# Patient Record
Sex: MALE | Race: BLACK OR AFRICAN AMERICAN | NOT HISPANIC OR LATINO | Employment: UNEMPLOYED | URBAN - METROPOLITAN AREA
[De-identification: names, ages, dates, MRNs, and addresses within clinical notes are randomized per-mention and may not be internally consistent; named-entity substitution may affect disease eponyms.]

---

## 2023-09-29 ENCOUNTER — HOSPITAL ENCOUNTER (EMERGENCY)
Facility: HOSPITAL | Age: 43
Discharge: HOME | End: 2023-09-29
Attending: STUDENT IN AN ORGANIZED HEALTH CARE EDUCATION/TRAINING PROGRAM

## 2023-09-29 VITALS
DIASTOLIC BLOOD PRESSURE: 87 MMHG | WEIGHT: 188.69 LBS | HEART RATE: 74 BPM | TEMPERATURE: 99 F | BODY MASS INDEX: 27.01 KG/M2 | OXYGEN SATURATION: 100 % | HEIGHT: 70 IN | SYSTOLIC BLOOD PRESSURE: 160 MMHG | RESPIRATION RATE: 16 BRPM

## 2023-09-29 DIAGNOSIS — J06.9 VIRAL URI WITH COUGH: Primary | ICD-10-CM

## 2023-09-29 LAB
FLUAV AG UPPER RESP QL IA.RAPID: NOT DETECTED
FLUBV AG UPPER RESP QL IA.RAPID: NOT DETECTED
RSV A 5' UTR RNA NPH QL NAA+PROBE: NOT DETECTED
SARS-COV-2 RNA RESP QL NAA+PROBE: NOT DETECTED
STREP A PCR (OHS): NOT DETECTED

## 2023-09-29 PROCEDURE — 87651 STREP A DNA AMP PROBE: CPT | Performed by: STUDENT IN AN ORGANIZED HEALTH CARE EDUCATION/TRAINING PROGRAM

## 2023-09-29 PROCEDURE — 25000003 PHARM REV CODE 250: Performed by: STUDENT IN AN ORGANIZED HEALTH CARE EDUCATION/TRAINING PROGRAM

## 2023-09-29 PROCEDURE — 0241U COVID/RSV/FLU A&B PCR: CPT | Performed by: STUDENT IN AN ORGANIZED HEALTH CARE EDUCATION/TRAINING PROGRAM

## 2023-09-29 PROCEDURE — 99284 EMERGENCY DEPT VISIT MOD MDM: CPT | Mod: 25

## 2023-09-29 RX ORDER — PROMETHAZINE HYDROCHLORIDE AND DEXTROMETHORPHAN HYDROBROMIDE 6.25; 15 MG/5ML; MG/5ML
5 SYRUP ORAL EVERY 6 HOURS PRN
Qty: 100 ML | Refills: 0 | Status: SHIPPED | OUTPATIENT
Start: 2023-09-29 | End: 2023-10-10 | Stop reason: ALTCHOICE

## 2023-09-29 RX ORDER — ACETAMINOPHEN 500 MG
1000 TABLET ORAL
Status: COMPLETED | OUTPATIENT
Start: 2023-09-29 | End: 2023-09-29

## 2023-09-29 RX ORDER — FLUTICASONE PROPIONATE 50 MCG
1 SPRAY, SUSPENSION (ML) NASAL 2 TIMES DAILY PRN
Qty: 15 G | Refills: 0 | Status: SHIPPED | OUTPATIENT
Start: 2023-09-29 | End: 2023-10-10 | Stop reason: SDUPTHER

## 2023-09-29 RX ORDER — IBUPROFEN 800 MG/1
800 TABLET ORAL EVERY 6 HOURS PRN
Qty: 20 TABLET | Refills: 0 | OUTPATIENT
Start: 2023-09-29 | End: 2024-01-03

## 2023-09-29 RX ADMIN — ACETAMINOPHEN 1000 MG: 500 TABLET, FILM COATED ORAL at 09:09

## 2023-09-30 NOTE — ED PROVIDER NOTES
Encounter Date: 9/29/2023       History     Chief Complaint   Patient presents with    Cough    Sore Throat    Fever     Cough, Congestion, sore throat x 2 days. Temp 101.1 in triage no meds pta.      43-year-old male with past medical history significant for hypertension, smoking and polysubstance abuse presents to ED complaining of 2 day history of URI symptoms marked by sore throat, congestion and productive cough.  Patient is febrile on arrival in ED with temperature of 101.1°, reports he was unaware of fever prior to this.  Allergy medicine at home without significant relief of symptoms.  Reports he was recently exposed to a sister who had similar symptoms, but was not tested.  Denies chest pain, shortness of breath, hemoptysis, wheezing, headache, dizziness, neck stiffness, dysphagia, stridor, drooling, nausea/vomiting/diarrhea.  Aside from elevated temperature vitals otherwise stable, patient in no acute distress.    Review of patient's allergies indicates:  No Known Allergies  History reviewed. No pertinent past medical history.  History reviewed. No pertinent surgical history.  History reviewed. No pertinent family history.  Social History     Tobacco Use    Smoking status: Every Day     Average packs/day: 0.9 packs/day for 10.7 years (10.0 ttl pk-yrs)     Types: Cigarettes     Start date: 2013    Smokeless tobacco: Never   Substance Use Topics    Drug use: Yes     Types: Marijuana     Review of Systems   All other systems reviewed and are negative.      Physical Exam     Initial Vitals [09/29/23 2110]   BP Pulse Resp Temp SpO2   (!) 160/87 89 16 (!) 101.1 °F (38.4 °C) 98 %      MAP       --         Physical Exam    Nursing note and vitals reviewed.  Constitutional: He appears well-developed and well-nourished. He is not diaphoretic. No distress.   HENT:   Head: Normocephalic and atraumatic.   Right Ear: External ear normal.   Left Ear: External ear normal.   Nose: Mucosal edema and rhinorrhea  present. Right sinus exhibits no maxillary sinus tenderness and no frontal sinus tenderness. Left sinus exhibits no maxillary sinus tenderness and no frontal sinus tenderness.   Mouth/Throat: Uvula is midline and mucous membranes are normal. No trismus in the jaw. No uvula swelling. Posterior oropharyngeal erythema present. No oropharyngeal exudate or posterior oropharyngeal edema.   Eyes: Conjunctivae are normal. Pupils are equal, round, and reactive to light.   Neck: Neck supple. No JVD present.   Normal range of motion.  Cardiovascular:  Normal rate, regular rhythm, normal heart sounds and intact distal pulses.     Exam reveals no gallop and no friction rub.       No murmur heard.  Pulmonary/Chest: Breath sounds normal. No stridor. No respiratory distress. He has no wheezes. He has no rhonchi. He has no rales. He exhibits no tenderness.   Abdominal: Abdomen is soft. Bowel sounds are normal. He exhibits no distension. There is no abdominal tenderness. There is no rebound and no guarding.   Musculoskeletal:         General: No tenderness or edema. Normal range of motion.      Cervical back: Normal range of motion and neck supple.     Lymphadenopathy:     He has no cervical adenopathy.   Neurological: He is alert and oriented to person, place, and time. No cranial nerve deficit or sensory deficit. GCS score is 15. GCS eye subscore is 4. GCS verbal subscore is 5. GCS motor subscore is 6.   Skin: Skin is warm and dry. Capillary refill takes less than 2 seconds. No rash noted. No pallor.   Psychiatric: He has a normal mood and affect.       ED Course   Procedures  Labs Reviewed   COVID/RSV/FLU A&B PCR - Normal    Narrative:     The Xpert Xpress SARS-CoV-2/FLU/RSV plus is a rapid, multiplexed real-time PCR test intended for the simultaneous qualitative detection and differentiation of SARS-CoV-2, Influenza A, Influenza B, and respiratory syncytial virus (RSV) viral RNA in either nasopharyngeal swab or nasal swab  specimens.         STREP GROUP A BY PCR - Normal    Narrative:     The Xpert Xpress Strep A test is a rapid, qualitative in vitro diagnostic test for the detection of Streptococcus pyogenes (Group A ß-hemolytic Streptococcus, Strep A) in throat swab specimens from patients with signs and symptoms of pharyngitis.            Imaging Results              X-Ray Chest PA And Lateral (Final result)  Result time 09/30/23 08:45:01      Final result by Rashid Espitia MD (09/30/23 08:45:01)                   Impression:      Findings concerning for left lower lobe pneumonia.      Electronically signed by: Rashid Espitia  Date:    09/30/2023  Time:    08:45               Narrative:    EXAMINATION:  XR CHEST PA AND LATERAL    CLINICAL HISTORY:  cough, fever;    TECHNIQUE:  Two views of the chest    COMPARISON:  No prior imaging available for comparison.    FINDINGS:  Left retrocardiac opacification.    The cardiomediastinal silhouette is within normal limits.    No acute osseous abnormality.                        Wet Read by Saman Tolentino PA (09/29/23 22:49:03, [facility], Emergency Medicine)    No acute cardiopulmonary abnormality.                                     Medications   acetaminophen tablet 1,000 mg (1,000 mg Oral Given 9/29/23 2134)     Medical Decision Making  DDx:  COVID, flu, other viral illness, strep throat, pneumonia, allergic rhinitis among other possibilities    ED management:  Given p.o. Tylenol    ED course:  COVID flu and strep swabs all negative.  No evidence of pneumonia or other acute cardiopulmonary abnormality on chest x-ray.  Patient is nontoxic appearing with unremarkable vitals and no signs of respiratory or other distress.  Stable for discharge.  I will send home with meds for symptom relief.  Referral placed to IM clinic to establish PCP for follow-up.  ED precautions given for new or worsening symptoms and patient verbalized understanding.    Amount and/or  Complexity of Data Reviewed  Labs: ordered. Decision-making details documented in ED Course.  Radiology: ordered and independent interpretation performed. Decision-making details documented in ED Course.    Risk  OTC drugs.  Prescription drug management.         APC / Resident Notes:   I was not physically present during the history or exam of this patient. I was available at all times for consultation.    The radiologist the following day formally read the chest x-ray as a left lower lobe pneumonia. Martha charge nurse will notify the patient and call in doxycycline antibiotic to their pharmacy of choice. ([facility])                        Clinical Impression:   Final diagnoses:  [J06.9] Viral URI with cough (Primary)        ED Disposition Condition    Discharge Good          ED Prescriptions       Medication Sig Dispense Start Date End Date Auth. Provider    promethazine-dextromethorphan (PROMETHAZINE-DM) 6.25-15 mg/5 mL Syrp Take 5 mLs by mouth every 6 (six) hours as needed (cough). 100 mL 9/29/2023 10/4/2023 Saman Tolentino PA    ibuprofen (ADVIL,MOTRIN) 800 MG tablet Take 1 tablet (800 mg total) by mouth every 6 (six) hours as needed for Pain. 20 tablet 9/29/2023 -- Saman Tolentino PA    fluticasone propionate (FLONASE) 50 mcg/actuation nasal spray 1 spray (50 mcg total) by Each Nostril route 2 (two) times daily as needed for Rhinitis. 15 g 9/29/2023 -- Saman Tolentino PA          Follow-up Information       Follow up With Specialties Details Why Contact Info Additional Information    [facility] Internal Medicine In 2 weeks  [address] [facility] [address] [phone] Internal Medicine Clinic Entrance #1    [facility] Emergency Medicine  As needed, If symptoms worsen [address] [address] [phone]              Saman Tolentino PA  09/29/23 [id number]       Abram Hansen MD  09/30/23 [id number]

## 2023-09-30 NOTE — ED NOTES
Received a verbal order from PABLO Acosta to call in an antibiotic for the patient due to a re-read of chest x ray from the radiologist indicating pneumonia.

## 2023-10-10 ENCOUNTER — OFFICE VISIT (OUTPATIENT)
Dept: INTERNAL MEDICINE | Facility: CLINIC | Age: 43
End: 2023-10-10

## 2023-10-10 ENCOUNTER — HOSPITAL ENCOUNTER (OUTPATIENT)
Dept: RADIOLOGY | Facility: HOSPITAL | Age: 43
Discharge: HOME | End: 2023-10-10
Attending: NURSE PRACTITIONER

## 2023-10-10 VITALS
SYSTOLIC BLOOD PRESSURE: 130 MMHG | TEMPERATURE: 98 F | BODY MASS INDEX: 27.17 KG/M2 | HEIGHT: 70 IN | WEIGHT: 189.81 LBS | HEART RATE: 76 BPM | RESPIRATION RATE: 18 BRPM | DIASTOLIC BLOOD PRESSURE: 82 MMHG

## 2023-10-10 DIAGNOSIS — Z13.1 SCREENING FOR DIABETES MELLITUS: ICD-10-CM

## 2023-10-10 DIAGNOSIS — Z12.5 SCREENING FOR MALIGNANT NEOPLASM OF PROSTATE: ICD-10-CM

## 2023-10-10 DIAGNOSIS — F41.9 ANXIETY AND DEPRESSION: Chronic | ICD-10-CM

## 2023-10-10 DIAGNOSIS — F43.10 PTSD (POST-TRAUMATIC STRESS DISORDER): Chronic | ICD-10-CM

## 2023-10-10 DIAGNOSIS — F31.9 BIPOLAR 1 DISORDER: Chronic | ICD-10-CM

## 2023-10-10 DIAGNOSIS — F32.A ANXIETY AND DEPRESSION: Chronic | ICD-10-CM

## 2023-10-10 DIAGNOSIS — J06.9 VIRAL URI WITH COUGH: ICD-10-CM

## 2023-10-10 DIAGNOSIS — Z76.89 ENCOUNTER TO ESTABLISH CARE: ICD-10-CM

## 2023-10-10 DIAGNOSIS — Z11.3 ROUTINE SCREENING FOR STI (SEXUALLY TRANSMITTED INFECTION): ICD-10-CM

## 2023-10-10 DIAGNOSIS — I10 PRIMARY HYPERTENSION: Chronic | ICD-10-CM

## 2023-10-10 DIAGNOSIS — J18.9 PNEUMONIA OF LEFT LOWER LOBE DUE TO INFECTIOUS ORGANISM: ICD-10-CM

## 2023-10-10 DIAGNOSIS — Z72.0 TOBACCO ABUSE: Primary | ICD-10-CM

## 2023-10-10 PROCEDURE — 71046 X-RAY EXAM CHEST 2 VIEWS: CPT | Mod: TC

## 2023-10-10 PROCEDURE — 99204 OFFICE O/P NEW MOD 45 MIN: CPT | Mod: 25,S$PBB,, | Performed by: NURSE PRACTITIONER

## 2023-10-10 PROCEDURE — 99215 OFFICE O/P EST HI 40 MIN: CPT | Mod: PBBFAC | Performed by: NURSE PRACTITIONER

## 2023-10-10 PROCEDURE — 99204 PR OFFICE/OUTPT VISIT, NEW, LEVL IV, 45-59 MIN: ICD-10-PCS | Mod: 25,S$PBB,, | Performed by: NURSE PRACTITIONER

## 2023-10-10 PROCEDURE — 99406 PR TOBACCO USE CESSATION INTERMEDIATE 3-10 MINUTES: ICD-10-PCS | Mod: S$PBB,,, | Performed by: NURSE PRACTITIONER

## 2023-10-10 PROCEDURE — 99406 BEHAV CHNG SMOKING 3-10 MIN: CPT | Mod: S$PBB,,, | Performed by: NURSE PRACTITIONER

## 2023-10-10 RX ORDER — FLUTICASONE PROPIONATE 50 MCG
1 SPRAY, SUSPENSION (ML) NASAL DAILY
Qty: 16 G | Refills: 1 | Status: SHIPPED | OUTPATIENT
Start: 2023-10-10

## 2023-10-10 RX ORDER — FLUTICASONE PROPIONATE 50 MCG
1 SPRAY, SUSPENSION (ML) NASAL DAILY
Qty: 16 G | Refills: 1 | Status: SHIPPED | OUTPATIENT
Start: 2023-10-10 | End: 2023-10-10 | Stop reason: SDUPTHER

## 2023-10-10 RX ORDER — LORATADINE 10 MG/1
10 TABLET ORAL DAILY
Qty: 30 TABLET | Refills: 1 | Status: SHIPPED | OUTPATIENT
Start: 2023-10-10

## 2023-10-10 RX ORDER — METHYLPREDNISOLONE 4 MG/1
TABLET ORAL
Qty: 21 EACH | Refills: 0 | Status: SHIPPED | OUTPATIENT
Start: 2023-10-10 | End: 2023-10-31

## 2023-10-10 RX ORDER — BENZONATATE 100 MG/1
100 CAPSULE ORAL 3 TIMES DAILY PRN
Qty: 30 CAPSULE | Refills: 0 | Status: SHIPPED | OUTPATIENT
Start: 2023-10-10 | End: 2023-10-20

## 2023-10-10 NOTE — PROGRESS NOTES
Milana Shields, CAMELIA   [facility]  [address] [facility] [address]      PATIENT NAME: Flako Mccray  : 1980  DATE: 10/10/23  MRN: [id number]      Reason for Visit / Chief Complaint: Establish Care (Fasting, refused vaccines)       History of Present Illness / Problem Focused Workflow     Flako Mccray is a 43 y.o. Black or  male presents to the clinic to establish primary care. PMH HTN, bipolar disorder, anxiety/depression, PTSD and tobacco abuse (onset ).    Today, pt presents to establish primary care after reporting to ED on 23 with URI symptoms - neg covid/flu/rsv/strep and CXR unremarkable. Was dx'd with LLL pneumonia rx'd z pack, flonase, IBU and promethazine DM with some improvement. Continues to endorse productive cough with thick white sputum. Pt recently moved to [place] from [place] in Aug after being incarcerated x 10 yrs. States was taking meds in senior care but does not know the name. Does not follow low sodium diet. Pt reports does not feel he needs psychiatric meds at this time. Reports had a lot of thins that went on in senior care that caused a lot of stress which he does not currently have to deal with. Declines vaccines today. Smokes 1 ppd/cigs and marijuana daily. Is fasting today. Will [facility] to [facility] for financial assistance after visit. Denies chest pain, shortness of breath, fever, headache, dizziness, weakness, abdominal pain, nausea, vomiting, diarrhea, constipation, dysuria, depression, anxiety, SI/HI.    Review of Systems     Review of Systems     See HPI for details    Constitutional: Denies Change in appetite. Denies Chills. Denies Fever. Denies Night sweats.  Eye: Denies Blurred vision. Denies Discharge. Denies Eye pain.  ENT: Denies Decreased hearing. Denies Sore throat. Denies Swollen glands.  Respiratory: Admits Cough. Denies Shortness of breath. Denies Shortness of breath  with exertion. Denies Wheezing.  Cardiovascular: Denies Chest pain at rest. Denies Chest pain with exertion. Denies Irregular heartbeat. Denies Palpitations. Denies Edema.  Gastrointestinal: Denies Abdominal pain. Denies Diarrhea. Denies Nausea. Denies Vomiting. Denies Hematemesis or Hematochezia.  Genitourinary: Denies Dysuria. Denies Urinary frequency. Denies Urinary urgency. Denies Blood in urine.  Endocrine: Denies Cold intolerance. Denies Excessive thirst. Denies Heat intolerance. Denies Weight loss. Denies Weight gain.  Musculoskeletal: Denies Painful joints. Denies Weakness.  Integumentary: Denies Rash. Denies Itching. Denies Dry skin.  Neurologic: Denies Dizziness. Denies Fainting. Denies Headache.  Psychiatric: Denies Depression. Denies Anxiety. Denies Suicidal/Homicidal ideations.    All Other ROS: Negative except as stated in HPI.     Medical / Surgical / Social / Family History       No past medical history on file.     History reviewed. No pertinent surgical history.    Social History     Socioeconomic History    Marital status:    Tobacco Use    Smoking status: Every Day     Current packs/day: 1.00     Average packs/day: 1 pack/day for 10.8 years (10.8 ttl pk-yrs)     Types: Cigarettes     Start date: 2013    Smokeless tobacco: Never   Substance and Sexual Activity    Alcohol use: Yes     Comment: liquor 2 x a week    Drug use: Yes     Types: Marijuana     Comment: daily    Sexual activity: Yes     Partners: Female     Social Determinants of Health     Transportation Needs: No Transportation Needs (10/10/2023)    PRAPARE - Transportation     Lack of Transportation (Medical): No     Lack of Transportation (Non-Medical): No   Housing Stability: Low Risk  (10/10/2023)    Housing Stability Vital Sign     Unable to Pay for Housing in the Last Year: No     Number of Places Lived in the Last Year: 2     Unstable Housing in the Last Year: No        Family History   Problem Relation Age of Onset     "Diabetes Mother     Hypertension Father     Diabetes Father     Aneurysm Sister     Diabetes Maternal Uncle     Cancer Paternal Aunt     Cancer Paternal Uncle     Aneurysm Paternal Grandmother     Cancer Paternal Grandfather         prostate    Stroke Paternal Grandfather     Cancer Paternal Cousin         Medications and Allergies     Medications  Current Outpatient Medications   Medication Instructions    fluticasone propionate (FLONASE) 50 mcg, Each Nostril, Daily    ibuprofen (ADVIL,MOTRIN) 800 mg, Oral, Every 6 hours PRN    loratadine (CLARITIN) 10 mg, Oral, Daily    methylPREDNISolone (MEDROL DOSEPACK) 4 mg tablet use as directed         Allergies  Review of patient's allergies indicates:  No Known Allergies    Physical Examination     /82 (BP Location: Left arm, Patient Position: Sitting, BP Method: Medium (Manual))   Pulse 76   Temp 98.3 °F (36.8 °C) (Oral)   Resp 18   Ht 5' 10" (1.778 m)   Wt 86.1 kg (189 lb 12.8 oz)   BMI 27.23 kg/m²     Physical Exam  HENT:      Nose:      Right Turbinates: Enlarged and swollen.      Left Turbinates: Enlarged and swollen.      Comments: +PND  Pulmonary:      Breath sounds: Examination of the right-lower field reveals rhonchi. Examination of the left-lower field reveals rhonchi. Rhonchi present.         General: Alert and oriented, No acute distress.  Head: Normocephalic, Atraumatic.  Eye: Pupils are equal, round and reactive to light, Extraocular movements are intact, Sclera non-icteric.  Ears/Nose/Throat: Normal, Mucosa moist,Clear.  Neck/Thyroid: Supple, Non-tender, No carotid bruit, No lymphadenopathy, No JVD, Full range of motion.  Respiratory: Non-labored respirations, Symmetrical chest wall expansion.  Cardiovascular: Regular rate and rhythm, S1/S2 normal, No murmurs, rubs or gallops.  Gastrointestinal: Soft, Non-tender, Non-distended, Normal bowel sounds, No palpable organomegaly.  Musculoskeletal: Normal range of motion.  Integumentary: Warm, Dry, " "Intact, No suspicious lesions or rashes.  Extremities: No clubbing, cyanosis or edema  Neurologic: No focal deficits, Cranial Nerves II-XII are grossly intact, Motor strength normal upper and lower extremities, Sensory exam intact.  Psychiatric: Normal interaction, Coherent speech, Appropriate affect       Results     No results found for: "WBC", "HGB", "HCT", "PLT", "CHOL", "TRIG", "LDLDIRECT", "ALT", "AST", "NA", "K", "CL", "CREATININE", "BUN", "CO2", "TSH", "PSA", "INR", "GLUF", "HGBA1C", "MICROALBUR"      Assessment and Plan (including Health Maintenance)     Plan:     1. Encounter to establish care  Labs ordered; pt is fasting.  Will attempt to obtain medical records from intermediate facility.    2. Pneumonia of left lower lobe due to infectious organism  Repeat CXR ordered; will notify of abn results.  Rx medrol dose pack.  Rx flonase and claritin.  Rx tessalon perles prn.  Smoking cessation discussed.    - X-Ray Chest PA And Lateral; Future    3. Primary hypertension  At goal without meds today.  Will continue to monitor.  Follow a low sodium (less than 2 grams of sodium per day), DASH diet.   Continue medications as prescribed.  Monitor blood pressure and report any consistent values greater than 140/90 and keep a log.  Encouraged smoking cessation to aid in BP reduction and co-morbidities.   Maintain healthy weight with a BMI goal of <30.   Aerobic exercise for 150 minutes per week (or 5 days a week for 30 minutes each day).    - CBC Auto Differential; Future  - Comprehensive Metabolic Panel; Future  - Lipid Panel; Future  - Microalbumin/Creatinine Ratio, Urine; Future  - TSH; Future  - Urinalysis, Reflex to Urine Culture; Future    4. Tobacco abuse  Smoking cessation discussed; total time 3 mins.   Pt not ready to quit.  Declines referral to Smoking Cessation program.  Discussed benefits of quitting including improved health, decreased cardiac/vascular/pulmonary/stroke risks as well as saving money.      5. " Anxiety and depression  Feels mood is stable without meds.  Declines referral to  to eval/treat.  Denies SI/HI.  Read positive daily meditations, avoid negative media, set healthy boundaries.  Exercise daily, keep consistent sleep pattern, eat a healthy diet.  Establish good social support, make changes to reduce stress.  Do not drink alcohol or use illicit drugs.  Reports any symptoms of suicidal/homicidal ideations or self harm immediately, go to nearest emergency room.      6. PTSD (post-traumatic stress disorder)  Feels mood is stable without meds.  Declines referral to  to eval/treat.  Denies SI/HI.  Read positive daily meditations, avoid negative media, set healthy boundaries.  Exercise daily, keep consistent sleep pattern, eat a healthy diet.  Establish good social support, make changes to reduce stress.  Do not drink alcohol or use illicit drugs.  Reports any symptoms of suicidal/homicidal ideations or self harm immediately, go to nearest emergency room.    7. Bipolar 1 disorder  Feels mood is stable without meds.  Declines referral to  to eval/treat.  Denies SI/HI or A/V hallucinations.  Read positive daily meditations, avoid negative media, set healthy boundaries.  Exercise daily, keep consistent sleep pattern, eat a healthy diet.  Establish good social support, make changes to reduce stress.  Do not drink alcohol or use illicit drugs.  Reports any symptoms of suicidal/homicidal ideations or self harm immediately, go to nearest emergency room.        Problem List Items Addressed This Visit          Psychiatric    Anxiety and depression (Chronic)    PTSD (post-traumatic stress disorder) (Chronic)    Bipolar 1 disorder (Chronic)       Pulmonary    LLL pneumonia    Relevant Orders    X-Ray Chest PA And Lateral       Cardiac/Vascular    Primary hypertension (Chronic)    Relevant Orders    CBC Auto Differential    Comprehensive Metabolic Panel    Lipid Panel    Microalbumin/Creatinine Ratio, Urine    TSH     Urinalysis, Reflex to Urine Culture       Other    Tobacco abuse - Primary (Chronic)    Encounter to establish care     Other Visit Diagnoses       Viral URI with cough        Screening for diabetes mellitus        Relevant Orders    Hemoglobin A1C    Screening for malignant neoplasm of prostate        Relevant Orders    PSA, Screening    Routine screening for STI (sexually transmitted infection)        Relevant Orders    Chlamydia/GC, PCR    Hepatitis Panel, Acute    HIV 1/2 Ag/Ab (4th Gen)    SYPHILIS ANTIBODY (WITH REFLEX RPR)              Health Maintenance Due   Topic Date Due    Hepatitis C Screening  Never done    Lipid Panel  Never done    COVID-19 Vaccine (1) Never done    Pneumococcal Vaccines (Age 0-64) (1 - PCV) Never done    HIV Screening  Never done    TETANUS VACCINE  Never done    Hemoglobin A1c (Diabetic Prevention Screening)  Never done       Follow up in about 3 weeks (around 10/31/2023) for Follow up, Lab Results, HTN.        Signature:  CAMELIA Schroeder  [facility]  [address] [facility] [address]

## 2023-10-12 ENCOUNTER — TELEPHONE (OUTPATIENT)
Dept: INTERNAL MEDICINE | Facility: CLINIC | Age: 43
End: 2023-10-12

## 2023-10-12 DIAGNOSIS — J18.9 PNEUMONIA OF LEFT LOWER LOBE DUE TO INFECTIOUS ORGANISM: Primary | ICD-10-CM

## 2023-10-12 NOTE — TELEPHONE ENCOUNTER
----- Message from CAMELIA Cuadra sent at 10/12/2023  7:06 AM CDT -----  Inform the pt that he will need to repeat his CBC prior to his visit on 11/1/23. He needs to come and have it drawn at least one day prior to his appt. Thanks

## 2023-11-23 ENCOUNTER — HOSPITAL ENCOUNTER (EMERGENCY)
Facility: HOSPITAL | Age: 43
Discharge: HOME | End: 2023-11-24
Attending: STUDENT IN AN ORGANIZED HEALTH CARE EDUCATION/TRAINING PROGRAM
Payer: COMMERCIAL

## 2023-11-23 DIAGNOSIS — R20.2 PARESTHESIAS IN RIGHT HAND: ICD-10-CM

## 2023-11-23 DIAGNOSIS — M79.603 ARM PAIN: ICD-10-CM

## 2023-11-23 DIAGNOSIS — N17.9 AKI (ACUTE KIDNEY INJURY): Primary | ICD-10-CM

## 2023-11-23 DIAGNOSIS — R20.2 PARESTHESIAS IN LEFT HAND: ICD-10-CM

## 2023-11-23 LAB
BASOPHILS # BLD AUTO: 0.07 X10(3)/MCL
BASOPHILS NFR BLD AUTO: 0.7 %
EOSINOPHIL # BLD AUTO: 0.29 X10(3)/MCL (ref 0–0.9)
EOSINOPHIL NFR BLD AUTO: 3 %
ERYTHROCYTE [DISTWIDTH] IN BLOOD BY AUTOMATED COUNT: 13.4 % (ref 11.5–17)
HCT VFR BLD AUTO: 43.3 % (ref 42–52)
HGB BLD-MCNC: 14 G/DL (ref 14–18)
IMM GRANULOCYTES # BLD AUTO: 0.03 X10(3)/MCL (ref 0–0.04)
IMM GRANULOCYTES NFR BLD AUTO: 0.3 %
LYMPHOCYTES # BLD AUTO: 3.1 X10(3)/MCL (ref 0.6–4.6)
LYMPHOCYTES NFR BLD AUTO: 31.6 %
MCH RBC QN AUTO: 31.1 PG (ref 27–31)
MCHC RBC AUTO-ENTMCNC: 32.3 G/DL (ref 33–36)
MCV RBC AUTO: 96.2 FL (ref 80–94)
MONOCYTES # BLD AUTO: 0.96 X10(3)/MCL (ref 0.1–1.3)
MONOCYTES NFR BLD AUTO: 9.8 %
NEUTROPHILS # BLD AUTO: 5.36 X10(3)/MCL (ref 2.1–9.2)
NEUTROPHILS NFR BLD AUTO: 54.6 %
NRBC BLD AUTO-RTO: 0 %
PLATELET # BLD AUTO: 309 X10(3)/MCL (ref 130–400)
PMV BLD AUTO: 8.5 FL (ref 7.4–10.4)
RBC # BLD AUTO: 4.5 X10(6)/MCL (ref 4.7–6.1)
WBC # SPEC AUTO: 9.81 X10(3)/MCL (ref 4.5–11.5)

## 2023-11-23 PROCEDURE — 99285 EMERGENCY DEPT VISIT HI MDM: CPT | Mod: 25

## 2023-11-23 PROCEDURE — 80053 COMPREHEN METABOLIC PANEL: CPT | Performed by: STUDENT IN AN ORGANIZED HEALTH CARE EDUCATION/TRAINING PROGRAM

## 2023-11-23 PROCEDURE — 85025 COMPLETE CBC W/AUTO DIFF WBC: CPT | Performed by: STUDENT IN AN ORGANIZED HEALTH CARE EDUCATION/TRAINING PROGRAM

## 2023-11-23 PROCEDURE — 83735 ASSAY OF MAGNESIUM: CPT | Performed by: STUDENT IN AN ORGANIZED HEALTH CARE EDUCATION/TRAINING PROGRAM

## 2023-11-23 PROCEDURE — 93005 ELECTROCARDIOGRAM TRACING: CPT

## 2023-11-24 VITALS
HEART RATE: 65 BPM | HEIGHT: 72 IN | DIASTOLIC BLOOD PRESSURE: 88 MMHG | TEMPERATURE: 98 F | OXYGEN SATURATION: 100 % | RESPIRATION RATE: 18 BRPM | WEIGHT: 184.94 LBS | SYSTOLIC BLOOD PRESSURE: 129 MMHG | BODY MASS INDEX: 25.05 KG/M2

## 2023-11-24 LAB
ALBUMIN SERPL-MCNC: 3.8 G/DL (ref 3.5–5)
ALBUMIN/GLOB SERPL: 1.3 RATIO (ref 1.1–2)
ALP SERPL-CCNC: 68 UNIT/L (ref 40–150)
ALT SERPL-CCNC: 19 UNIT/L (ref 0–55)
AST SERPL-CCNC: 23 UNIT/L (ref 5–34)
BILIRUB SERPL-MCNC: 0.3 MG/DL
BUN SERPL-MCNC: 21 MG/DL (ref 8.9–20.6)
CALCIUM SERPL-MCNC: 9.3 MG/DL (ref 8.4–10.2)
CHLORIDE SERPL-SCNC: 110 MMOL/L (ref 98–107)
CO2 SERPL-SCNC: 25 MMOL/L (ref 22–29)
CREAT SERPL-MCNC: 1.21 MG/DL (ref 0.73–1.18)
GFR SERPLBLD CREATININE-BSD FMLA CKD-EPI: >60 MLS/MIN/1.73/M2
GLOBULIN SER-MCNC: 2.9 GM/DL (ref 2.4–3.5)
GLUCOSE SERPL-MCNC: 128 MG/DL (ref 74–100)
MAGNESIUM SERPL-MCNC: 2.1 MG/DL (ref 1.6–2.6)
POTASSIUM SERPL-SCNC: 3.7 MMOL/L (ref 3.5–5.1)
PROT SERPL-MCNC: 6.7 GM/DL (ref 6.4–8.3)
SODIUM SERPL-SCNC: 144 MMOL/L (ref 136–145)

## 2023-11-24 PROCEDURE — 25000003 PHARM REV CODE 250: Performed by: STUDENT IN AN ORGANIZED HEALTH CARE EDUCATION/TRAINING PROGRAM

## 2023-11-24 PROCEDURE — 96360 HYDRATION IV INFUSION INIT: CPT

## 2023-11-24 RX ADMIN — SODIUM CHLORIDE 1000 ML: 9 INJECTION, SOLUTION INTRAVENOUS at 12:11
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
## 2024-01-03 ENCOUNTER — HOSPITAL ENCOUNTER (EMERGENCY)
Facility: HOSPITAL | Age: 44
Discharge: HOME | End: 2024-01-03
Attending: STUDENT IN AN ORGANIZED HEALTH CARE EDUCATION/TRAINING PROGRAM
Payer: COMMERCIAL

## 2024-01-03 VITALS
RESPIRATION RATE: 18 BRPM | SYSTOLIC BLOOD PRESSURE: 147 MMHG | BODY MASS INDEX: 27.1 KG/M2 | HEART RATE: 64 BPM | HEIGHT: 71 IN | OXYGEN SATURATION: 100 % | TEMPERATURE: 98 F | WEIGHT: 193.56 LBS | DIASTOLIC BLOOD PRESSURE: 95 MMHG

## 2024-01-03 DIAGNOSIS — H57.89 IRRITATION OF RIGHT EYE: ICD-10-CM
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
## 2024-01-03 PROCEDURE — 25000003 PHARM REV CODE 250: Performed by: NURSE PRACTITIONER

## 2024-01-03 PROCEDURE — 99284 EMERGENCY DEPT VISIT MOD MDM: CPT | Mod: 25

## 2024-01-03 RX ORDER — ERYTHROMYCIN 5 MG/G
OINTMENT OPHTHALMIC EVERY 6 HOURS
Qty: 1 G | Refills: 0 | Status: SHIPPED | OUTPATIENT
Start: 2024-01-03 | End: 2024-01-10

## 2024-01-03 RX ORDER — DICLOFENAC SODIUM 75 MG/1
75 TABLET, DELAYED RELEASE ORAL 2 TIMES DAILY
Qty: 14 TABLET | Refills: 0 | Status: SHIPPED | OUTPATIENT
Start: 2024-01-03 | End: 2024-01-10

## 2024-01-03 RX ORDER — TETRACAINE HYDROCHLORIDE 5 MG/ML
2 SOLUTION OPHTHALMIC
Status: COMPLETED | OUTPATIENT
Start: 2024-01-03 | End: 2024-01-03

## 2024-01-03 RX ADMIN — FLUORESCEIN SODIUM 1 EACH: 1 STRIP OPHTHALMIC at 05:01

## 2024-01-03 RX ADMIN — TETRACAINE HYDROCHLORIDE 2 DROP: 5 SOLUTION OPHTHALMIC at 05:01
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
## 2024-01-04 ENCOUNTER — HOSPITAL ENCOUNTER (EMERGENCY)
Facility: HOSPITAL | Age: 44
Discharge: HOME | End: 2024-01-04
Attending: INTERNAL MEDICINE
Payer: COMMERCIAL

## 2024-01-04 VITALS
HEIGHT: 71 IN | WEIGHT: 195.75 LBS | HEART RATE: 84 BPM | TEMPERATURE: 98 F | SYSTOLIC BLOOD PRESSURE: 137 MMHG | OXYGEN SATURATION: 100 % | BODY MASS INDEX: 27.4 KG/M2 | RESPIRATION RATE: 16 BRPM | DIASTOLIC BLOOD PRESSURE: 91 MMHG

## 2024-01-04 DIAGNOSIS — H57.89 IRRITATION OF RIGHT EYE: ICD-10-CM
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
## 2024-01-04 PROCEDURE — 99281 EMR DPT VST MAYX REQ PHY/QHP: CPT

## 2024-01-04 NOTE — DISCHARGE INSTRUCTIONS
Warm compresses to affected eye 3 times daily.  Take medication as prescribed.  Follow up with IM Clinic as discussed to obtain a PCP.  Present to nearest ED immediately if vision changes.

## 2024-01-04 NOTE — DISCHARGE INSTRUCTIONS
your prescriptions from your last ER visit and take as prescribed.     It is important that you follow up with your primary care provider or specialist if indicated for further evaluation, workup, and treatment as necessary. The exam and treatment you received in Emergency Department was for an urgent problem and NOT INTENDED AS COMPLETE CARE. It is important that you FOLLOW UP with a doctor for ongoing care. If your symptoms become WORSE or you DO NOT IMPROVE and you are unable to reach your health care provider, you should RETURN to the Emergency Department.
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
## 2024-01-15 PROBLEM — J18.9 LLL PNEUMONIA: Status: RESOLVED | Noted: 2023-10-10 | Resolved: 2024-01-15

## 2024-09-07 ENCOUNTER — HOSPITAL ENCOUNTER (INPATIENT)
Facility: HOSPITAL | Age: 44
LOS: 3 days | Discharge: HOME | DRG: 638 | End: 2024-09-10
Attending: INTERNAL MEDICINE | Admitting: INTERNAL MEDICINE
Payer: COMMERCIAL

## 2024-09-07 DIAGNOSIS — B37.0 ORAL THRUSH: ICD-10-CM

## 2024-09-07 DIAGNOSIS — R63.4 ABNORMAL INTENTIONAL WEIGHT LOSS: Primary | ICD-10-CM

## 2024-09-07 DIAGNOSIS — E87.5 HYPERKALEMIA: ICD-10-CM

## 2024-09-07 DIAGNOSIS — E11.10 DIABETIC KETOACIDOSIS: ICD-10-CM

## 2024-09-07 LAB
ALBUMIN SERPL-MCNC: 4.2 G/DL (ref 3.5–5)
ALBUMIN/GLOB SERPL: 1 RATIO (ref 1.1–2)
ALLENS TEST BLOOD GAS (OHS): ABNORMAL
ALP SERPL-CCNC: 104 UNIT/L (ref 40–150)
ALT SERPL-CCNC: 11 UNIT/L (ref 0–55)
AMPHET UR QL SCN: NEGATIVE
ANION GAP SERPL CALC-SCNC: 17 MEQ/L
ANION GAP SERPL CALC-SCNC: 27 MEQ/L
AST SERPL-CCNC: 15 UNIT/L (ref 5–34)
B-OH-BUTYR SERPL-MCNC: 10.4 MMOL/L
BACTERIA #/AREA URNS AUTO: ABNORMAL /HPF
BARBITURATE SCN PRESENT UR: NEGATIVE
BASE EXCESS BLD CALC-SCNC: -19.1 MMOL/L
BASOPHILS # BLD AUTO: 0.06 X10(3)/MCL
BASOPHILS NFR BLD AUTO: 0.4 %
BENZODIAZ UR QL SCN: NEGATIVE
BILIRUB SERPL-MCNC: 0.6 MG/DL
BILIRUB UR QL STRIP.AUTO: NEGATIVE
BLOOD GAS SAMPLE TYPE (OHS): ABNORMAL
BUN SERPL-MCNC: 16.2 MG/DL (ref 8.9–20.6)
BUN SERPL-MCNC: 19 MG/DL (ref 8.9–20.6)
CALCIUM SERPL-MCNC: 10.1 MG/DL (ref 8.4–10.2)
CALCIUM SERPL-MCNC: 9.3 MG/DL (ref 8.4–10.2)
CANNABINOIDS UR QL SCN: NEGATIVE
CHLORIDE SERPL-SCNC: 104 MMOL/L (ref 98–107)
CHLORIDE SERPL-SCNC: 91 MMOL/L (ref 98–107)
CLARITY UR: CLEAR
CO2 BLDA-SCNC: 10.2 MMOL/L
CO2 SERPL-SCNC: 10 MMOL/L (ref 22–29)
CO2 SERPL-SCNC: 12 MMOL/L (ref 22–29)
COCAINE UR QL SCN: NEGATIVE
COHGB MFR BLDA: 2.3 %
COLOR UR AUTO: YELLOW
CREAT SERPL-MCNC: 1.75 MG/DL (ref 0.73–1.18)
CREAT SERPL-MCNC: 2.03 MG/DL (ref 0.73–1.18)
CREAT/UREA NIT SERPL: 9
CREAT/UREA NIT SERPL: 9
CRP SERPL-MCNC: 2.1 MG/L
DRAWN BY BLOOD GAS (OHS): ABNORMAL
EOSINOPHIL # BLD AUTO: 0.28 X10(3)/MCL (ref 0–0.9)
EOSINOPHIL NFR BLD AUTO: 2 %
ERYTHROCYTE [DISTWIDTH] IN BLOOD BY AUTOMATED COUNT: 11.4 % (ref 11.5–17)
EST. AVERAGE GLUCOSE BLD GHB EST-MCNC: ABNORMAL MG/DL
FENTANYL UR QL SCN: NEGATIVE
GFR SERPLBLD CREATININE-BSD FMLA CKD-EPI: 41 ML/MIN/1.73/M2
GFR SERPLBLD CREATININE-BSD FMLA CKD-EPI: 49 ML/MIN/1.73/M2
GLOBULIN SER-MCNC: 4.1 GM/DL (ref 2.4–3.5)
GLUCOSE SERPL-MCNC: 484 MG/DL (ref 74–100)
GLUCOSE SERPL-MCNC: 574 MG/DL (ref 74–100)
GLUCOSE UR QL STRIP: ABNORMAL
HBA1C MFR BLD: >14 %
HCO3 BLDA-SCNC: 9.3 MMOL/L
HCT VFR BLD AUTO: 52.8 % (ref 42–52)
HGB BLD-MCNC: 17.6 G/DL (ref 14–18)
HGB UR QL STRIP: NEGATIVE
HIV 1+2 AB+HIV1 P24 AG SERPL QL IA: NONREACTIVE
HOLD SPECIMEN: NORMAL
HYALINE CASTS #/AREA URNS LPF: ABNORMAL /LPF
IMM GRANULOCYTES # BLD AUTO: 0.09 X10(3)/MCL (ref 0–0.04)
IMM GRANULOCYTES NFR BLD AUTO: 0.6 %
KETONES UR QL STRIP: ABNORMAL
LEUKOCYTE ESTERASE UR QL STRIP: NEGATIVE
LYMPHOCYTES # BLD AUTO: 1.56 X10(3)/MCL (ref 0.6–4.6)
LYMPHOCYTES NFR BLD AUTO: 11.2 %
MAGNESIUM SERPL-MCNC: 2.5 MG/DL (ref 1.6–2.6)
MCH RBC QN AUTO: 31.7 PG (ref 27–31)
MCHC RBC AUTO-ENTMCNC: 33.3 G/DL (ref 33–36)
MCV RBC AUTO: 95 FL (ref 80–94)
MDMA UR QL SCN: NEGATIVE
METHGB MFR BLDA: 0.9 %
MONOCYTES # BLD AUTO: 0.7 X10(3)/MCL (ref 0.1–1.3)
MONOCYTES NFR BLD AUTO: 5 %
MUCOUS THREADS URNS QL MICRO: ABNORMAL /LPF
NEUTROPHILS # BLD AUTO: 11.26 X10(3)/MCL (ref 2.1–9.2)
NEUTROPHILS NFR BLD AUTO: 80.8 %
NITRITE UR QL STRIP: NEGATIVE
NRBC BLD AUTO-RTO: 0 %
O2 HB BLOOD GAS (OHS): 67.4 %
OPIATES UR QL SCN: NEGATIVE
OXYHGB MFR BLDA: 17.2 G/DL
PCO2 BLDA: 30 MMHG (ref 40–50)
PCP UR QL: NEGATIVE
PH BLDA: 7.1 [PH] (ref 7.3–7.4)
PH UR STRIP: 5 [PH]
PH UR: 5 [PH] (ref 3–11)
PHOSPHATE SERPL-MCNC: 5 MG/DL (ref 2.3–4.7)
PLATELET # BLD AUTO: 306 X10(3)/MCL (ref 130–400)
PMV BLD AUTO: 11.1 FL (ref 7.4–10.4)
PO2 BLDA: 37 MMHG (ref 30–40)
POCT GLUCOSE: 464 MG/DL (ref 70–110)
POCT GLUCOSE: 486 MG/DL (ref 70–110)
POCT GLUCOSE: >500 MG/DL (ref 70–110)
POCT GLUCOSE: >500 MG/DL (ref 70–110)
POTASSIUM SERPL-SCNC: 4.7 MMOL/L (ref 3.5–5.1)
POTASSIUM SERPL-SCNC: 5.6 MMOL/L (ref 3.5–5.1)
PROT SERPL-MCNC: 8.3 GM/DL (ref 6.4–8.3)
PROT UR QL STRIP: ABNORMAL
RBC # BLD AUTO: 5.56 X10(6)/MCL (ref 4.7–6.1)
RBC #/AREA URNS AUTO: ABNORMAL /HPF
SAO2 % BLDA: 69.6 %
SODIUM SERPL-SCNC: 128 MMOL/L (ref 136–145)
SODIUM SERPL-SCNC: 133 MMOL/L (ref 136–145)
SP GR UR STRIP.AUTO: 1.03 (ref 1–1.03)
SPECIFIC GRAVITY, URINE AUTO (.000) (OHS): 1.03 (ref 1–1.03)
SQUAMOUS #/AREA URNS LPF: ABNORMAL /HPF
T4 FREE SERPL-MCNC: 1.18 NG/DL (ref 0.7–1.48)
TROPONIN I SERPL-MCNC: <0.01 NG/ML (ref 0–0.04)
TSH SERPL-ACNC: 0.21 UIU/ML (ref 0.35–4.94)
UROBILINOGEN UR STRIP-ACNC: NORMAL
WBC # BLD AUTO: 13.95 X10(3)/MCL (ref 4.5–11.5)
WBC #/AREA URNS AUTO: ABNORMAL /HPF

## 2024-09-07 PROCEDURE — 25000003 PHARM REV CODE 250: Performed by: INTERNAL MEDICINE

## 2024-09-07 PROCEDURE — 83036 HEMOGLOBIN GLYCOSYLATED A1C: CPT | Performed by: NURSE PRACTITIONER

## 2024-09-07 PROCEDURE — 81001 URINALYSIS AUTO W/SCOPE: CPT | Mod: XB | Performed by: NURSE PRACTITIONER

## 2024-09-07 PROCEDURE — 82803 BLOOD GASES ANY COMBINATION: CPT

## 2024-09-07 PROCEDURE — 20000000 HC ICU ROOM

## 2024-09-07 PROCEDURE — 87389 HIV-1 AG W/HIV-1&-2 AB AG IA: CPT | Performed by: NURSE PRACTITIONER

## 2024-09-07 PROCEDURE — 25000003 PHARM REV CODE 250

## 2024-09-07 PROCEDURE — 80053 COMPREHEN METABOLIC PANEL: CPT | Performed by: NURSE PRACTITIONER

## 2024-09-07 PROCEDURE — 84439 ASSAY OF FREE THYROXINE: CPT

## 2024-09-07 PROCEDURE — 96365 THER/PROPH/DIAG IV INF INIT: CPT

## 2024-09-07 PROCEDURE — 84443 ASSAY THYROID STIM HORMONE: CPT

## 2024-09-07 PROCEDURE — 99291 CRITICAL CARE FIRST HOUR: CPT

## 2024-09-07 PROCEDURE — 84484 ASSAY OF TROPONIN QUANT: CPT | Performed by: NURSE PRACTITIONER

## 2024-09-07 PROCEDURE — 36415 COLL VENOUS BLD VENIPUNCTURE: CPT | Performed by: INTERNAL MEDICINE

## 2024-09-07 PROCEDURE — 84100 ASSAY OF PHOSPHORUS: CPT | Performed by: INTERNAL MEDICINE

## 2024-09-07 PROCEDURE — 63600175 PHARM REV CODE 636 W HCPCS

## 2024-09-07 PROCEDURE — 99900035 HC TECH TIME PER 15 MIN (STAT)

## 2024-09-07 PROCEDURE — 25000003 PHARM REV CODE 250: Performed by: NURSE PRACTITIONER

## 2024-09-07 PROCEDURE — 86140 C-REACTIVE PROTEIN: CPT

## 2024-09-07 PROCEDURE — 80307 DRUG TEST PRSMV CHEM ANLYZR: CPT | Performed by: NURSE PRACTITIONER

## 2024-09-07 PROCEDURE — 82010 KETONE BODYS QUAN: CPT | Performed by: NURSE PRACTITIONER

## 2024-09-07 PROCEDURE — 93005 ELECTROCARDIOGRAM TRACING: CPT

## 2024-09-07 PROCEDURE — 83735 ASSAY OF MAGNESIUM: CPT | Performed by: INTERNAL MEDICINE

## 2024-09-07 PROCEDURE — 96361 HYDRATE IV INFUSION ADD-ON: CPT

## 2024-09-07 PROCEDURE — 85025 COMPLETE CBC W/AUTO DIFF WBC: CPT | Performed by: NURSE PRACTITIONER

## 2024-09-07 RX ORDER — SODIUM CHLORIDE 0.9 % (FLUSH) 0.9 %
10 SYRINGE (ML) INJECTION
Status: DISCONTINUED | OUTPATIENT
Start: 2024-09-07 | End: 2024-09-10 | Stop reason: HOSPADM

## 2024-09-07 RX ORDER — GLUCAGON 1 MG
1 KIT INJECTION
Status: DISCONTINUED | OUTPATIENT
Start: 2024-09-07 | End: 2024-09-10 | Stop reason: HOSPADM

## 2024-09-07 RX ORDER — POTASSIUM CHLORIDE 7.45 MG/ML
80 INJECTION INTRAVENOUS
Status: DISCONTINUED | OUTPATIENT
Start: 2024-09-07 | End: 2024-09-09

## 2024-09-07 RX ORDER — DEXTROSE MONOHYDRATE 100 MG/ML
INJECTION, SOLUTION INTRAVENOUS
Status: DISCONTINUED | OUTPATIENT
Start: 2024-09-07 | End: 2024-09-09

## 2024-09-07 RX ORDER — MUPIROCIN 20 MG/G
OINTMENT TOPICAL 2 TIMES DAILY
Status: CANCELLED | OUTPATIENT
Start: 2024-09-07 | End: 2024-09-12

## 2024-09-07 RX ORDER — LABETALOL HCL 20 MG/4 ML
10 SYRINGE (ML) INTRAVENOUS EVERY 4 HOURS PRN
Status: DISCONTINUED | OUTPATIENT
Start: 2024-09-07 | End: 2024-09-10 | Stop reason: HOSPADM

## 2024-09-07 RX ORDER — IBUPROFEN 200 MG
24 TABLET ORAL
Status: DISCONTINUED | OUTPATIENT
Start: 2024-09-07 | End: 2024-09-10 | Stop reason: HOSPADM

## 2024-09-07 RX ORDER — NALOXONE HCL 0.4 MG/ML
0.02 VIAL (ML) INJECTION
Status: DISCONTINUED | OUTPATIENT
Start: 2024-09-07 | End: 2024-09-10 | Stop reason: HOSPADM

## 2024-09-07 RX ORDER — IBUPROFEN 200 MG
16 TABLET ORAL
Status: DISCONTINUED | OUTPATIENT
Start: 2024-09-07 | End: 2024-09-10 | Stop reason: HOSPADM

## 2024-09-07 RX ORDER — ENOXAPARIN SODIUM 100 MG/ML
40 INJECTION SUBCUTANEOUS EVERY 24 HOURS
Status: DISCONTINUED | OUTPATIENT
Start: 2024-09-07 | End: 2024-09-10 | Stop reason: HOSPADM

## 2024-09-07 RX ORDER — ONDANSETRON HYDROCHLORIDE 2 MG/ML
8 INJECTION, SOLUTION INTRAVENOUS EVERY 8 HOURS PRN
Status: DISCONTINUED | OUTPATIENT
Start: 2024-09-07 | End: 2024-09-10 | Stop reason: HOSPADM

## 2024-09-07 RX ORDER — POTASSIUM CHLORIDE 7.45 MG/ML
60 INJECTION INTRAVENOUS
Status: DISCONTINUED | OUTPATIENT
Start: 2024-09-07 | End: 2024-09-09

## 2024-09-07 RX ORDER — FLUCONAZOLE 2 MG/ML
200 INJECTION, SOLUTION INTRAVENOUS
Status: DISCONTINUED | OUTPATIENT
Start: 2024-09-07 | End: 2024-09-08

## 2024-09-07 RX ORDER — SODIUM CHLORIDE 9 MG/ML
1000 INJECTION, SOLUTION INTRAVENOUS CONTINUOUS
Status: ACTIVE | OUTPATIENT
Start: 2024-09-07 | End: 2024-09-08

## 2024-09-07 RX ORDER — DEXTROSE MONOHYDRATE AND SODIUM CHLORIDE 5; .45 G/100ML; G/100ML
INJECTION, SOLUTION INTRAVENOUS CONTINUOUS PRN
Status: DISCONTINUED | OUTPATIENT
Start: 2024-09-07 | End: 2024-09-09

## 2024-09-07 RX ORDER — POTASSIUM CHLORIDE 7.45 MG/ML
40 INJECTION INTRAVENOUS
Status: DISCONTINUED | OUTPATIENT
Start: 2024-09-07 | End: 2024-09-09

## 2024-09-07 RX ADMIN — INSULIN HUMAN 0.1 UNITS/KG/HR: 1 INJECTION, SOLUTION INTRAVENOUS at 06:09

## 2024-09-07 RX ADMIN — FLUCONAZOLE 200 MG: 2 INJECTION, SOLUTION INTRAVENOUS at 10:09

## 2024-09-07 RX ADMIN — CEFAZOLIN 2 G: 2 INJECTION, POWDER, FOR SOLUTION INTRAMUSCULAR; INTRAVENOUS at 08:09

## 2024-09-07 RX ADMIN — SODIUM CHLORIDE 1000 ML: 9 INJECTION, SOLUTION INTRAVENOUS at 04:09

## 2024-09-07 RX ADMIN — SODIUM CHLORIDE 1000 ML: 9 INJECTION, SOLUTION INTRAVENOUS at 06:09

## 2024-09-07 RX ADMIN — ENOXAPARIN SODIUM 40 MG: 40 INJECTION SUBCUTANEOUS at 08:09

## 2024-09-07 NOTE — ED PROVIDER NOTES
"Encounter Date: 9/7/2024       History     Chief Complaint   Patient presents with    Eating Disorder     C/o losing weight, no appetite, rash to tongue and mouth for a few days. On antibiotics due to infection of finger. Cbg 464. No hx of dm    Rash    Hyperglycemia     Cbg 464     The patient presents with weakness, weight loss - he estimates 60lbs since January, white rash to tongue for few weeks.  The onset was several months.  The course/duration of symptoms is constant and increasing.  The character of symptoms is generalized, lack of stamina, lack of strength, "tired".  The degree at present is moderate.  Risk factors consist of none.  Prior episodes: none.  Therapy today: none.  Associated symptoms: denies chest pain, denies fever, denies chills, denies cough, denies nausea, denies vomiting and denies abdominal pain.  in triage. He has no history of diabetes.         Review of patient's allergies indicates:  No Known Allergies  History reviewed. No pertinent past medical history.  History reviewed. No pertinent surgical history.  Family History   Problem Relation Name Age of Onset    Diabetes Mother      Hypertension Father      Diabetes Father      Aneurysm Sister      Diabetes Maternal Uncle      Cancer Paternal Aunt      Cancer Paternal Uncle      Aneurysm Paternal Grandmother      Cancer Paternal Grandfather          prostate    Stroke Paternal Grandfather      Cancer Paternal Cousin       Social History     Tobacco Use    Smoking status: Former     Current packs/day: 1.00     Average packs/day: 1 pack/day for 11.7 years (11.7 ttl pk-yrs)     Types: Cigarettes     Start date: 2013    Smokeless tobacco: Never   Substance Use Topics    Alcohol use: Yes     Comment: liquor 2 x a week    Drug use: Not Currently     Types: Marijuana     Comment: daily     Review of Systems   Constitutional:  Positive for appetite change and unexpected weight change. Negative for fever.   HENT:  Positive for mouth " sores. Negative for sore throat.    Respiratory:  Negative for shortness of breath.    Cardiovascular:  Negative for chest pain.   Gastrointestinal:  Negative for nausea.   Genitourinary:  Negative for dysuria.   Musculoskeletal:  Negative for back pain.   Skin:  Negative for rash.   Neurological:  Positive for weakness.   Hematological:  Does not bruise/bleed easily.   All other systems reviewed and are negative.      Physical Exam     Initial Vitals [09/07/24 1612]   BP Pulse Resp Temp SpO2   (!) 143/111 (!) 111 20 98.1 °F (36.7 °C) 100 %      MAP       --         Physical Exam    Nursing note and vitals reviewed.  Constitutional: He appears well-developed and well-nourished.   HENT:   Head: Normocephalic and atraumatic.   Right Ear: Tympanic membrane normal.   Left Ear: Tympanic membrane normal.   Nose: Nose normal.   Mouth/Throat: Uvula is midline, oropharynx is clear and moist and mucous membranes are normal.   White coating to tongue consistent with oral thrush.   Neck: Neck supple.   Normal range of motion.  Cardiovascular:  Normal rate, regular rhythm, normal heart sounds and intact distal pulses.           Pulmonary/Chest: Effort normal and breath sounds normal. He has no decreased breath sounds.   Abdominal: Abdomen is soft and flat. Bowel sounds are normal. There is no abdominal tenderness.   Musculoskeletal:         General: Normal range of motion.      Cervical back: Normal range of motion and neck supple.     Neurological: He is alert and oriented to person, place, and time. He has normal strength.   Skin: Skin is warm and dry.   Psychiatric: He has a normal mood and affect.         ED Course   Procedures  Labs Reviewed   COMPREHENSIVE METABOLIC PANEL - Abnormal       Result Value    Sodium 128 (*)     Potassium 5.6 (*)     Chloride 91 (*)     CO2 10 (*)     Glucose 574 (*)     Blood Urea Nitrogen 19.0      Creatinine 2.03 (*)     Calcium 10.1      Protein Total 8.3      Albumin 4.2      Globulin 4.1  (*)     Albumin/Globulin Ratio 1.0 (*)     Bilirubin Total 0.6            ALT 11      AST 15      eGFR 41      Anion Gap 27.0      BUN/Creatinine Ratio 9     HEMOGLOBIN A1C - Abnormal    Hemoglobin A1c >14.0 (*)     Estimated Average Glucose       BETA - HYDROXYBUTYRATE, SERUM - Abnormal    Beta Hydroxybutyrate 10.40 (*)    URINALYSIS, REFLEX TO URINE CULTURE - Abnormal    Color, UA Yellow      Appearance, UA Clear      Specific Gravity, UA 1.028      pH, UA 5.0      Protein, UA Trace (*)     Glucose, UA 4+ (*)     Ketones, UA 4+ (*)     Blood, UA Negative      Bilirubin, UA Negative      Urobilinogen, UA Normal      Nitrites, UA Negative      Leukocyte Esterase, UA Negative      RBC, UA None Seen      WBC, UA 0-5      Bacteria, UA None Seen      Squamous Epithelial Cells, UA None Seen      Mucous, UA Trace (*)     Hyaline Casts, UA None Seen     CBC WITH DIFFERENTIAL - Abnormal    WBC 13.95 (*)     RBC 5.56      Hgb 17.6      Hct 52.8 (*)     MCV 95.0 (*)     MCH 31.7 (*)     MCHC 33.3      RDW 11.4 (*)     Platelet 306      MPV 11.1 (*)     Neut % 80.8      Lymph % 11.2      Mono % 5.0      Eos % 2.0      Basophil % 0.4      Lymph # 1.56      Neut # 11.26 (*)     Mono # 0.70      Eos # 0.28      Baso # 0.06      IG# 0.09 (*)     IG% 0.6      NRBC% 0.0     POCT GLUCOSE - Abnormal    POCT Glucose 464 (*)    DRUG SCREEN, URINE (BEAKER) - Normal    Amphetamines, Urine Negative      Barbiturates, Urine Negative      Benzodiazepine, Urine Negative      Cannabinoids, Urine Negative      Cocaine, Urine Negative      Fentanyl, Urine Negative      MDMA, Urine Negative      Opiates, Urine Negative      Phencyclidine, Urine Negative      pH, Urine 5.0      Specific Gravity, Urine Auto 1.028      Narrative:     Cut off concentrations:    Amphetamines - 1000 ng/ml  Barbiturates - 200 ng/ml  Benzodiazepine - 200 ng/ml  Cannabinoids (THC) - 50 ng/ml  Cocaine - 300 ng/ml  Fentanyl - 1.0 ng/ml  MDMA - 500 ng/ml  Opiates -  300 ng/ml   Phencyclidine (PCP) - 25 ng/ml    Specimen submitted for drug analysis and tested for pH and specific gravity in order to evaluate sample integrity. Suspect tampering if specific gravity is <1.003 and/or pH is not within the range of 4.5 - 8.0  False negatives may result form substances such as bleach added to urine.  False positives may result for the presence of a substance with similar chemical structure to the drug or its metabolite.    This test provides only a PRELIMINARY analytical test result. A more specific alternate chemical method must be used in order to obtain a confirmed analytical result. Gas chromatography/mass spectrometry (GC/MS) is the preferred confirmatory method. Other chemical confirmation methods are available. Clinical consideration and professional judgement should be applied to any drug of abuse test result, particularly when preliminary positive results are used.    Positive results will be confirmed only at the physicians request. Unconfirmed screening results are to be used only for medical purposes (treatment).        HIV 1 / 2 ANTIBODY - Normal    HIV Nonreactive     CBC W/ AUTO DIFFERENTIAL    Narrative:     The following orders were created for panel order CBC auto differential.  Procedure                               Abnormality         Status                     ---------                               -----------         ------                     CBC with Differential[[id number]]       Abnormal            Final result                 Please view results for these tests on the individual orders.   EXTRA TUBES    Narrative:     The following orders were created for panel order EXTRA TUBES.  Procedure                               Abnormality         Status                     ---------                               -----------         ------                     Red Top Hold[[id number]]                                    In process                   Please view results  "for these tests on the individual orders.   RED TOP HOLD   MAGNESIUM   PHOSPHORUS   TROPONIN I   POCT GLUCOSE MONITORING CONTINUOUS          Imaging Results    None          Medications   sodium chloride 0.9% flush 10 mL (has no administration in time range)   0.9%  NaCl infusion (has no administration in time range)   dextrose 5 % and 0.45 % NaCl infusion (has no administration in time range)   dextrose 10 % infusion (has no administration in time range)   dextrose 10 % infusion (has no administration in time range)   insulin regular bolus from bag/infusion 6.6 Units 6.6 mL (has no administration in time range)   insulin regular in 0.9 % NaCl 100 unit/100 mL (1 unit/mL) infusion (has no administration in time range)   potassium chloride 10 mEq in 100 mL IVPB (has no administration in time range)     And   potassium chloride 10 mEq in 100 mL IVPB (has no administration in time range)     And   potassium chloride 10 mEq in 100 mL IVPB (has no administration in time range)   dextrose 10% bolus 125 mL 125 mL (has no administration in time range)   dextrose 10% bolus 250 mL 250 mL (has no administration in time range)   sodium chloride 0.9% bolus 1,000 mL 1,000 mL (1,000 mLs Intravenous New Bag 9/7/24 9947)     Medical Decision Making  The patient presents with weakness, weight loss - he estimates 60lbs since January, white rash to tongue for few weeks.  The onset was several months.  The course/duration of symptoms is constant and increasing.  The character of symptoms is generalized, lack of stamina, lack of strength, "tired".  The degree at present is moderate.  Risk factors consist of none.  Prior episodes: none.  Therapy today: none.  Associated symptoms: denies chest pain, denies fever, denies chills, denies cough, denies nausea, denies vomiting and denies abdominal pain.  in triage. He has no history of diabetes.        Amount and/or Complexity of Data Reviewed  External Data Reviewed: notes.     Details: " 11/18/23 weight 190lbs, today 145lbs  Labs: ordered.  Radiology: ordered.  Discussion of management or test interpretation with external provider(s): Consulted Dr Coles (ER staff) who advised to consult medicine service for admission, consulted medicine service    Risk  Prescription drug management.  Decision regarding hospitalization.      Additional MDM:   Differential Diagnosis:   At this time differential diagnosis is but not limited to weakness, acute coronary syndrome, dehydration, electrolyte imbalance                                         Clinical Impression:  Final diagnoses:  [R63.4] Abnormal intentional weight loss (Primary)  [B37.0] Oral thrush  [E87.5] Hyperkalemia  [E11.10] Diabetic ketoacidosis          ED Disposition Condition    Admit Critical                Emanuel Garrett, [facility]  09/07/24 8015

## 2024-09-08 LAB
ALBUMIN SERPL-MCNC: 3.9 G/DL (ref 3.5–5)
ALBUMIN/GLOB SERPL: 1 RATIO (ref 1.1–2)
ALP SERPL-CCNC: 95 UNIT/L (ref 40–150)
ALT SERPL-CCNC: 9 UNIT/L (ref 0–55)
ANION GAP SERPL CALC-SCNC: 12 MEQ/L
ANION GAP SERPL CALC-SCNC: 7 MEQ/L
ANION GAP SERPL CALC-SCNC: 9 MEQ/L
AST SERPL-CCNC: 9 UNIT/L (ref 5–34)
BASOPHILS # BLD AUTO: 0.04 X10(3)/MCL
BASOPHILS NFR BLD AUTO: 0.3 %
BILIRUB SERPL-MCNC: 0.5 MG/DL
BUN SERPL-MCNC: 12.5 MG/DL (ref 8.9–20.6)
BUN SERPL-MCNC: 13.2 MG/DL (ref 8.9–20.6)
BUN SERPL-MCNC: 13.4 MG/DL (ref 8.9–20.6)
CALCIUM SERPL-MCNC: 9 MG/DL (ref 8.4–10.2)
CALCIUM SERPL-MCNC: 9.2 MG/DL (ref 8.4–10.2)
CALCIUM SERPL-MCNC: 9.6 MG/DL (ref 8.4–10.2)
CHLORIDE SERPL-SCNC: 108 MMOL/L (ref 98–107)
CHLORIDE SERPL-SCNC: 111 MMOL/L (ref 98–107)
CHLORIDE SERPL-SCNC: 111 MMOL/L (ref 98–107)
CO2 SERPL-SCNC: 17 MMOL/L (ref 22–29)
CO2 SERPL-SCNC: 18 MMOL/L (ref 22–29)
CO2 SERPL-SCNC: 20 MMOL/L (ref 22–29)
CREAT SERPL-MCNC: 1.26 MG/DL (ref 0.73–1.18)
CREAT SERPL-MCNC: 1.29 MG/DL (ref 0.73–1.18)
CREAT SERPL-MCNC: 1.51 MG/DL (ref 0.73–1.18)
CREAT/UREA NIT SERPL: 10
CREAT/UREA NIT SERPL: 10
CREAT/UREA NIT SERPL: 9
EOSINOPHIL # BLD AUTO: 1.29 X10(3)/MCL (ref 0–0.9)
EOSINOPHIL NFR BLD AUTO: 9.9 %
ERYTHROCYTE [DISTWIDTH] IN BLOOD BY AUTOMATED COUNT: 11.3 % (ref 11.5–17)
GFR SERPLBLD CREATININE-BSD FMLA CKD-EPI: 58 ML/MIN/1.73/M2
GFR SERPLBLD CREATININE-BSD FMLA CKD-EPI: >60 ML/MIN/1.73/M2
GFR SERPLBLD CREATININE-BSD FMLA CKD-EPI: >60 ML/MIN/1.73/M2
GLOBULIN SER-MCNC: 3.8 GM/DL (ref 2.4–3.5)
GLUCOSE SERPL-MCNC: 132 MG/DL (ref 74–100)
GLUCOSE SERPL-MCNC: 170 MG/DL (ref 74–100)
GLUCOSE SERPL-MCNC: 187 MG/DL (ref 74–100)
HCT VFR BLD AUTO: 45.8 % (ref 42–52)
HGB BLD-MCNC: 16.4 G/DL (ref 14–18)
HOLD SPECIMEN: NORMAL
IMM GRANULOCYTES # BLD AUTO: 0.07 X10(3)/MCL (ref 0–0.04)
IMM GRANULOCYTES NFR BLD AUTO: 0.5 %
LYMPHOCYTES # BLD AUTO: 2.84 X10(3)/MCL (ref 0.6–4.6)
LYMPHOCYTES NFR BLD AUTO: 21.7 %
MAGNESIUM SERPL-MCNC: 2.4 MG/DL (ref 1.6–2.6)
MCH RBC QN AUTO: 32.6 PG (ref 27–31)
MCHC RBC AUTO-ENTMCNC: 35.8 G/DL (ref 33–36)
MCV RBC AUTO: 91.1 FL (ref 80–94)
MONOCYTES # BLD AUTO: 0.93 X10(3)/MCL (ref 0.1–1.3)
MONOCYTES NFR BLD AUTO: 7.1 %
NEUTROPHILS # BLD AUTO: 7.89 X10(3)/MCL (ref 2.1–9.2)
NEUTROPHILS NFR BLD AUTO: 60.5 %
NRBC BLD AUTO-RTO: 0 %
OHS QRS DURATION: 82 MS
OHS QTC CALCULATION: 531 MS
PHOSPHATE SERPL-MCNC: 1.8 MG/DL (ref 2.3–4.7)
PLATELET # BLD AUTO: 343 X10(3)/MCL (ref 130–400)
PMV BLD AUTO: 9.7 FL (ref 7.4–10.4)
POCT GLUCOSE: 120 MG/DL (ref 70–110)
POCT GLUCOSE: 122 MG/DL (ref 70–110)
POCT GLUCOSE: 137 MG/DL (ref 70–110)
POCT GLUCOSE: 150 MG/DL (ref 70–110)
POCT GLUCOSE: 166 MG/DL (ref 70–110)
POCT GLUCOSE: 172 MG/DL (ref 70–110)
POCT GLUCOSE: 203 MG/DL (ref 70–110)
POCT GLUCOSE: 286 MG/DL (ref 70–110)
POCT GLUCOSE: 366 MG/DL (ref 70–110)
POCT GLUCOSE: 389 MG/DL (ref 70–110)
POCT GLUCOSE: 484 MG/DL (ref 70–110)
POCT GLUCOSE: >500 MG/DL (ref 70–110)
POTASSIUM SERPL-SCNC: 3.6 MMOL/L (ref 3.5–5.1)
POTASSIUM SERPL-SCNC: 3.7 MMOL/L (ref 3.5–5.1)
POTASSIUM SERPL-SCNC: 3.8 MMOL/L (ref 3.5–5.1)
PROT SERPL-MCNC: 7.7 GM/DL (ref 6.4–8.3)
RBC # BLD AUTO: 5.03 X10(6)/MCL (ref 4.7–6.1)
SODIUM SERPL-SCNC: 135 MMOL/L (ref 136–145)
SODIUM SERPL-SCNC: 138 MMOL/L (ref 136–145)
SODIUM SERPL-SCNC: 140 MMOL/L (ref 136–145)
T PALLIDUM AB SER QL: NONREACTIVE
WBC # BLD AUTO: 13.06 X10(3)/MCL (ref 4.5–11.5)

## 2024-09-08 PROCEDURE — 86780 TREPONEMA PALLIDUM: CPT

## 2024-09-08 PROCEDURE — 63600175 PHARM REV CODE 636 W HCPCS

## 2024-09-08 PROCEDURE — 25000003 PHARM REV CODE 250: Performed by: INTERNAL MEDICINE

## 2024-09-08 PROCEDURE — S5010 5% DEXTROSE AND 0.45% SALINE: HCPCS | Performed by: INTERNAL MEDICINE

## 2024-09-08 PROCEDURE — 80053 COMPREHEN METABOLIC PANEL: CPT

## 2024-09-08 PROCEDURE — 83735 ASSAY OF MAGNESIUM: CPT

## 2024-09-08 PROCEDURE — 25000003 PHARM REV CODE 250

## 2024-09-08 PROCEDURE — 94761 N-INVAS EAR/PLS OXIMETRY MLT: CPT

## 2024-09-08 PROCEDURE — 84100 ASSAY OF PHOSPHORUS: CPT

## 2024-09-08 PROCEDURE — 63700000 PHARM REV CODE 250 ALT 637 W/O HCPCS

## 2024-09-08 PROCEDURE — 36415 COLL VENOUS BLD VENIPUNCTURE: CPT | Performed by: INTERNAL MEDICINE

## 2024-09-08 PROCEDURE — 11000001 HC ACUTE MED/SURG PRIVATE ROOM

## 2024-09-08 PROCEDURE — 85025 COMPLETE CBC W/AUTO DIFF WBC: CPT

## 2024-09-08 RX ORDER — INSULIN ASPART 100 [IU]/ML
0-5 INJECTION, SOLUTION INTRAVENOUS; SUBCUTANEOUS
Status: DISCONTINUED | OUTPATIENT
Start: 2024-09-08 | End: 2024-09-08

## 2024-09-08 RX ORDER — INSULIN ASPART 100 [IU]/ML
3 INJECTION, SOLUTION INTRAVENOUS; SUBCUTANEOUS
Status: DISCONTINUED | OUTPATIENT
Start: 2024-09-08 | End: 2024-09-09

## 2024-09-08 RX ORDER — INSULIN GLARGINE 100 [IU]/ML
10 INJECTION, SOLUTION SUBCUTANEOUS 2 TIMES DAILY
Status: DISCONTINUED | OUTPATIENT
Start: 2024-09-08 | End: 2024-09-09

## 2024-09-08 RX ORDER — INSULIN ASPART 100 [IU]/ML
3 INJECTION, SOLUTION INTRAVENOUS; SUBCUTANEOUS
Status: DISCONTINUED | OUTPATIENT
Start: 2024-09-08 | End: 2024-09-08

## 2024-09-08 RX ORDER — INSULIN ASPART 100 [IU]/ML
0-10 INJECTION, SOLUTION INTRAVENOUS; SUBCUTANEOUS
Status: DISCONTINUED | OUTPATIENT
Start: 2024-09-08 | End: 2024-09-10 | Stop reason: HOSPADM

## 2024-09-08 RX ORDER — FLUCONAZOLE 100 MG/1
200 TABLET ORAL NIGHTLY
Status: DISCONTINUED | OUTPATIENT
Start: 2024-09-08 | End: 2024-09-10 | Stop reason: HOSPADM

## 2024-09-08 RX ORDER — INSULIN GLARGINE 100 [IU]/ML
10 INJECTION, SOLUTION SUBCUTANEOUS DAILY
Status: DISCONTINUED | OUTPATIENT
Start: 2024-09-08 | End: 2024-09-08

## 2024-09-08 RX ORDER — MUPIROCIN 20 MG/G
OINTMENT TOPICAL 2 TIMES DAILY
Status: DISCONTINUED | OUTPATIENT
Start: 2024-09-08 | End: 2024-09-10 | Stop reason: HOSPADM

## 2024-09-08 RX ORDER — INSULIN GLARGINE 100 [IU]/ML
10 INJECTION, SOLUTION SUBCUTANEOUS NIGHTLY
Status: DISCONTINUED | OUTPATIENT
Start: 2024-09-08 | End: 2024-09-08

## 2024-09-08 RX ADMIN — INSULIN ASPART 3 UNITS: 100 INJECTION, SOLUTION INTRAVENOUS; SUBCUTANEOUS at 06:09

## 2024-09-08 RX ADMIN — INSULIN ASPART 5 UNITS: 100 INJECTION, SOLUTION INTRAVENOUS; SUBCUTANEOUS at 09:09

## 2024-09-08 RX ADMIN — DEXTROSE AND SODIUM CHLORIDE: 5; 450 INJECTION, SOLUTION INTRAVENOUS at 01:09

## 2024-09-08 RX ADMIN — INSULIN ASPART 5 UNITS: 100 INJECTION, SOLUTION INTRAVENOUS; SUBCUTANEOUS at 04:09

## 2024-09-08 RX ADMIN — CEFAZOLIN 2 G: 2 INJECTION, POWDER, FOR SOLUTION INTRAMUSCULAR; INTRAVENOUS at 09:09

## 2024-09-08 RX ADMIN — CEFAZOLIN 2 G: 2 INJECTION, POWDER, FOR SOLUTION INTRAMUSCULAR; INTRAVENOUS at 05:09

## 2024-09-08 RX ADMIN — MUPIROCIN: 20 OINTMENT TOPICAL at 08:09

## 2024-09-08 RX ADMIN — INSULIN ASPART 5 UNITS: 100 INJECTION, SOLUTION INTRAVENOUS; SUBCUTANEOUS at 10:09

## 2024-09-08 RX ADMIN — FLUCONAZOLE 200 MG: 100 TABLET ORAL at 09:09

## 2024-09-08 RX ADMIN — CEFAZOLIN 2 G: 2 INJECTION, POWDER, FOR SOLUTION INTRAMUSCULAR; INTRAVENOUS at 01:09

## 2024-09-08 RX ADMIN — INSULIN GLARGINE 10 UNITS: 100 INJECTION, SOLUTION SUBCUTANEOUS at 06:09

## 2024-09-08 RX ADMIN — MUPIROCIN: 20 OINTMENT TOPICAL at 09:09

## 2024-09-08 RX ADMIN — INSULIN ASPART 3 UNITS: 100 INJECTION, SOLUTION INTRAVENOUS; SUBCUTANEOUS at 03:09

## 2024-09-08 RX ADMIN — ENOXAPARIN SODIUM 40 MG: 40 INJECTION SUBCUTANEOUS at 05:09

## 2024-09-08 RX ADMIN — HUMAN INSULIN 10 UNITS: 100 INJECTION, SOLUTION SUBCUTANEOUS at 11:09

## 2024-09-08 RX ADMIN — INSULIN ASPART 3 UNITS: 100 INJECTION, SOLUTION INTRAVENOUS; SUBCUTANEOUS at 10:09

## 2024-09-08 RX ADMIN — HUMAN INSULIN 10 UNITS: 100 INJECTION, SOLUTION SUBCUTANEOUS at 04:09

## 2024-09-08 RX ADMIN — INSULIN GLARGINE 10 UNITS: 100 INJECTION, SOLUTION SUBCUTANEOUS at 09:09

## 2024-09-08 NOTE — CARE UPDATE
I have staffed this patient with the morning resident team. Mr. Mccray was admitted to my service. New onset DM found to be in DKA which has resolved after protocol, transitioned to long acting and prandial dosing with SSI as well. He has oral thrush with complaints of difficulty swallowing solids and some discomfort, as well as significant weight loss of around 45 lbs over last 6 months. Risk factors of smoking raise concern for malignancy, however possible esophageal candidiasis can be cause of symptoms. Empirically treating with fluconazole, will need at least 3 weeks of treatment. Will likely consult GI for inpatient scope given alarm findings of significant unintentional weight loss in setting of dysphagia to solids, as he would benefit from urgent scope and if this cannot be set up to be performed quickly outpatient better to do inpatient. Will downgrade today, optimize diabetic regimen and perform education prior to discharge on glucometer and insulin administration. Finger appears mildly infected, can deescalate to oral antibiotics today.     Marta Pruett MD

## 2024-09-08 NOTE — PLAN OF CARE
Problem: Adult Inpatient Plan of Care  Goal: Plan of Care Review  9/7/2024 2233 by Sue Doss RN  Outcome: Progressing  9/7/2024 2233 by Sue Doss RN  Outcome: Progressing  9/7/2024 2233 by Sue Doss RN  Outcome: Progressing  Goal: Patient-Specific Goal (Individualized)  9/7/2024 2233 by Sue Doss RN  Outcome: Progressing  9/7/2024 2233 by Sue Doss RN  Outcome: Progressing  9/7/2024 2233 by uSe Doss RN  Outcome: Progressing  Goal: Absence of Hospital-Acquired Illness or Injury  9/7/2024 2233 by Sue Doss RN  Outcome: Progressing  9/7/2024 2233 by Sue Doss RN  Outcome: Progressing  9/7/2024 2233 by Sue Doss RN  Outcome: Progressing  Goal: Optimal Comfort and Wellbeing  9/7/2024 2233 by Sue Doss RN  Outcome: Progressing  9/7/2024 2233 by Sue Doss RN  Outcome: Progressing  9/7/2024 2233 by Sue Doss RN  Outcome: Progressing  Goal: Readiness for Transition of Care  9/7/2024 2233 by Sue Doss RN  Outcome: Progressing  9/7/2024 2233 by Sue Doss RN  Outcome: Progressing  9/7/2024 2233 by Sue Doss RN  Outcome: Progressing

## 2024-09-08 NOTE — H&P
[facility] - Emergency Dept  Pulmonary Critical Care Note    Patient Name: Flako Mccray  MRN: [id number]  Admission Date: 9/7/2024  Hospital Length of Stay: 0 days  Code Status: No Order  Attending Provider: Denise Perales*  Primary Care Provider: Arlyn Zendejas FNP     Subjective:     HPI:   Flako Mccray is a 43 yo M w PMHx of HTN, prior PCP use, bipolar 1, PTSD, anxiety and depression who presents to [facility] ED on 9/7/24 with complaints of fatigue and dry mouth x 3 days. Patient additionally reports nausea and billious non-bloody vomiting that started earlier today. He also reports trouble swallowing with a sand paper like sensation in his mouth and throat when he eats and swallows that started in 12/2023. Since then, patient states he lost over 45 lbs unintentionally. He denies fevers, chills, HA, changes in vision, chest pain, shortness of breath, abdominal pain, dysuria, and bowel changes. Patient admits to smoking about a half a pack of cigarettes for the last 3 months which was increased from 2-3 cigarettes since April of this year. Denies EtOH and recreational drug use. Patient is not aware of any chronic diseases he was diagnosed with previously and does not take any daily prescription medications. Of note, patient was placed on bactrim for hang nail that started to bother him about a week and a half ago. He only took 3 days of bactrim BID but did not take dose today.     Upon ED arrival, patient was tachycardic , mildly hypertensive /111 and mildly tachypneic RR 20 but afebrile on RA. Lab work significant for mild neutrophil predominant leukocytosis WBC 13.95, hyponatremic 128, hyperkalemic 5.6, KATHY BUN/Cr 19/20.3, hyperglycemic 574 and metabolic acidosis with bicarb 10 and anion gap 27.  A1xc >14 and BHB 10.4. UA consistent with glucosuria and ketonuria 4+. ABG consistent with metabolic acidosis pH 7.1. UDS, HIV and troponin negative. Internal medicine  consulted for DKA.     Hospital Course/Significant events:  Admit to ICU - 9/7/24     24 Hour Interval History:  NA    History reviewed. No pertinent past medical history.    History reviewed. No pertinent surgical history.    Social History     Socioeconomic History    Marital status:    Tobacco Use    Smoking status: Former     Current packs/day: 1.00     Average packs/day: 1 pack/day for 11.7 years (11.7 ttl pk-yrs)     Types: Cigarettes     Start date: 2013    Smokeless tobacco: Never   Substance and Sexual Activity    Alcohol use: Yes     Comment: liquor 2 x a week    Drug use: Not Currently     Types: Marijuana     Comment: daily    Sexual activity: Yes     Partners: Female     Social Determinants of Health     Transportation Needs: No Transportation Needs (10/10/2023)    PRAPARE - Transportation     Lack of Transportation (Medical): No     Lack of Transportation (Non-Medical): No   Housing Stability: Low Risk  (10/10/2023)    Housing Stability Vital Sign     Unable to Pay for Housing in the Last Year: No     Number of Places Lived in the Last Year: 2     Unstable Housing in the Last Year: No           Current Outpatient Medications   Medication Instructions    fluticasone propionate (FLONASE) 50 mcg, Each Nostril, Daily    loratadine (CLARITIN) 10 mg, Oral, Daily       Current Inpatient Medications      Current Intravenous Infusions   0.9% NaCl  1,000 mL Intravenous Continuous 125 mL/hr at 09/07/24 1845 1,000 mL at 09/07/24 1845    D5 and 0.45% NaCl   Intravenous Continuous PRN        insulin regular 1 units/mL infusion orderable (DKA)  0-0.2 Units/kg/hr Intravenous Continuous 6.6 mL/hr at 09/07/24 1856 0.1 Units/kg/hr at 09/07/24 1856         ROS   As stated in HPI     Objective:       Intake/Output Summary (Last 24 hours) at 9/7/2024 1925  Last data filed at 9/7/2024 1844  Gross per 24 hour   Intake 1000 ml   Output --   Net 1000 ml         Vital Signs (Most Recent):  Temp: 98.1 °F (36.7 °C)  (09/07/24 1612)  Pulse: 93 (09/07/24 1800)  Resp: 20 (09/07/24 1612)  BP: 138/84 (09/07/24 1800)  SpO2: 100 % (09/07/24 1800)  Body mass index is 20.29 kg/m².  Weight: 66 kg (145 lb 8.1 oz) Vital Signs (24h Range):  Temp:  [98.1 °F (36.7 °C)] 98.1 °F (36.7 °C)  Pulse:  [] 93  Resp:  [20] 20  SpO2:  [100 %] 100 %  BP: (138-143)/() 138/84     Physical Exam  Vitals reviewed.   Constitutional:       General: He is not in acute distress.     Appearance: He is normal weight. He is not ill-appearing, toxic-appearing or diaphoretic.   HENT:      Head: Normocephalic and atraumatic.      Nose: No congestion or rhinorrhea.      Mouth/Throat:      Mouth: Mucous membranes are dry. Oral lesions present.      Pharynx: Oropharynx is clear. No pharyngeal swelling or posterior oropharyngeal erythema.      Comments: White plaque, diffuse throughout tongue. No white plaque visualized on tonsils.   Eyes:      General: No scleral icterus.        Right eye: No discharge.         Left eye: No discharge.      Pupils: Pupils are equal, round, and reactive to light.   Cardiovascular:      Rate and Rhythm: Normal rate and regular rhythm.      Pulses: Normal pulses.      Heart sounds: No murmur heard.  Pulmonary:      Effort: Pulmonary effort is normal. No respiratory distress.      Breath sounds: Normal breath sounds. No wheezing, rhonchi or rales.   Abdominal:      General: Abdomen is flat. Bowel sounds are normal. There is no distension.      Palpations: Abdomen is soft.      Tenderness: There is no abdominal tenderness. There is no guarding.   Musculoskeletal:         General: Normal range of motion.      Cervical back: Normal range of motion.      Right lower leg: No edema.      Left lower leg: No edema.   Lymphadenopathy:      Cervical: No cervical adenopathy.   Skin:     General: Skin is warm and dry.      Capillary Refill: Capillary refill takes less than 2 seconds.      Findings: No lesion or rash.   Neurological:       "General: No focal deficit present.      Mental Status: He is alert and oriented to person, place, and time. Mental status is at baseline.      Cranial Nerves: No cranial nerve deficit.      Motor: No weakness.   Psychiatric:         Behavior: Behavior is cooperative.           Lines/Drains/Airways       Peripheral Intravenous Line  Duration                  Peripheral IV - Single Lumen 09/07/24 1655 20 G Left Antecubital <1 day         Peripheral IV - Single Lumen 09/07/24 1844 20 G Anterior;Proximal;Right Forearm <1 day                    Significant Labs:    Lab Results   Component Value Date    WBC 13.95 (H) 09/07/2024    HGB 17.6 09/07/2024    HCT 52.8 (H) 09/07/2024    MCV 95.0 (H) 09/07/2024     09/07/2024           BMP  Lab Results   Component Value Date     (L) 09/07/2024    K 5.6 (H) 09/07/2024    CO2 10 (L) 09/07/2024    BUN 19.0 09/07/2024    CREATININE 2.03 (H) 09/07/2024    CALCIUM 10.1 09/07/2024    AGAP 27.0 09/07/2024         ABG  No results for input(s): "PH", "PO2", "PCO2", "HCO3", "POCBASEDEF" in the last 168 hours.    Mechanical Ventilation Support:         Significant Imaging:  I have reviewed the pertinent imaging within the past 24 hours.    Imaging Results              X-Ray Chest AP Portable (In process)                   Results for orders placed or performed during the hospital encounter of 11/23/23   EKG 12-lead    Collection Time: 11/23/23 11:54 PM    Narrative    Test Reason : M79.603,    Vent. Rate : 077 BPM     Atrial Rate : 077 BPM     P-R Int : 120 ms          QRS Dur : 094 ms      QT Int : 392 ms       P-R-T Axes : 037 044 -06 degrees     QTc Int : 443 ms    Normal sinus rhythm  Possible Inferior infarct ,age undetermined  Abnormal ECG  No previous ECGs available  Confirmed by Alberto Wylie MD ([id number]) on 11/24/2023 11:26:15 AM    Referred By: [email or web address]   SELF           Confirmed By:Alberto Wylie MD        Assessment/Plan:     Assessment  DKA 2/2 New Onset DM  KATHY   Mild " Leukocytosis   Unintentional weight loss 2/2 Oropharyngeal vs Esophageal Candidiasis   Paronychia   Hx of HTN per chart review   Hx of Bipolar 1, PTSD, Anxiety and Depression per chart review   Hx of substance abuse - PCP per chart review   Chronic tobacco use   Fhx of colorectal ca - grandfather       Plan  Admitted to ICU with cardiac monitoring, DKA Protocol initiated with q1h accuchecks while on insulin drip.   On arrival: CB  Urine Ketones: 4+ Bicarb: 10  Gap: 27  BHB: 10.4  pH: 7.1   HbA1c today 24:  >14  Likely 2/2 thrush vs candidal esophagitis vs paronychia   Home DM regimen:  None      Transition to SQ insulin and continue the drip for additional 2 hours if able to tolerate PO w/o N/V  Bicarb > 18  Anion gap < 12  Glucose < 250 on 2 consecutive readings.    SQ insulin dosing: SSI or 0.5-0.8 U/kg divided between Long and short acting  Once blood sugar reaches 200, transition to D5 1/2 NS @ 150 cc/h  Monitor electrolytes with BMP q4h, replete per DKA protocol. Keep K > 4, Mg > 2, Phos > 3  Bariatric clear liquid diet while on insulin gtt then change to Diabetic diet when initiating subq insulin with ACHS accuchecks.  Fluconazole IV 200mg qd for 14 days to cover esophageal candidiasis, will add PO Nystatin when off DKA protocol   Ancef 2g q 8hr for 7 days to cover paronychia, s/p 3 days Bactrim BID PO prior to admission   PRN antiemetic in place   PRN labetalol in place   Additional work-up: Syphilis, TSH, T4, and CRP pending  Consider GI consult for EGD on Monday AM for unintentional weight loss and odynophagia     DVT Prophylaxis: Lovenox   GI Prophylaxis: None      32 minutes of critical care was time spent personally by me on the following activities: development of treatment plan with patient or surrogate and bedside caregivers, discussions with consultants, evaluation of patient's response to treatment, examination of patient, ordering and performing treatments and interventions,  ordering and review of laboratory studies, ordering and review of radiographic studies, pulse oximetry, re-evaluation of patient's condition.  This critical care time did not overlap with that of any other provider or involve time for any procedures.     Estrella Thompson MD  Pulmonary Critical Care Medicine  [facility] - Emergency Dept  DOS: 09/07/2024

## 2024-09-09 LAB
ALBUMIN SERPL-MCNC: 3 G/DL (ref 3.5–5)
ALBUMIN/GLOB SERPL: 1 RATIO (ref 1.1–2)
ALP SERPL-CCNC: 69 UNIT/L (ref 40–150)
ALT SERPL-CCNC: 5 UNIT/L (ref 0–55)
ANION GAP SERPL CALC-SCNC: 7 MEQ/L
ANION GAP SERPL CALC-SCNC: 7 MEQ/L
AST SERPL-CCNC: 10 UNIT/L (ref 5–34)
BASOPHILS # BLD AUTO: 0.05 X10(3)/MCL
BASOPHILS NFR BLD AUTO: 0.6 %
BILIRUB SERPL-MCNC: 0.4 MG/DL
BUN SERPL-MCNC: 10.2 MG/DL (ref 8.9–20.6)
BUN SERPL-MCNC: 11.1 MG/DL (ref 8.9–20.6)
CALCIUM SERPL-MCNC: 8.9 MG/DL (ref 8.4–10.2)
CALCIUM SERPL-MCNC: 9 MG/DL (ref 8.4–10.2)
CHLORIDE SERPL-SCNC: 103 MMOL/L (ref 98–107)
CHLORIDE SERPL-SCNC: 105 MMOL/L (ref 98–107)
CO2 SERPL-SCNC: 20 MMOL/L (ref 22–29)
CO2 SERPL-SCNC: 21 MMOL/L (ref 22–29)
CREAT SERPL-MCNC: 0.88 MG/DL (ref 0.73–1.18)
CREAT SERPL-MCNC: 1.06 MG/DL (ref 0.73–1.18)
CREAT/UREA NIT SERPL: 10
CREAT/UREA NIT SERPL: 12
EOSINOPHIL # BLD AUTO: 1 X10(3)/MCL (ref 0–0.9)
EOSINOPHIL NFR BLD AUTO: 11.5 %
ERYTHROCYTE [DISTWIDTH] IN BLOOD BY AUTOMATED COUNT: 11.3 % (ref 11.5–17)
GFR SERPLBLD CREATININE-BSD FMLA CKD-EPI: >60 ML/MIN/1.73/M2
GFR SERPLBLD CREATININE-BSD FMLA CKD-EPI: >60 ML/MIN/1.73/M2
GLOBULIN SER-MCNC: 2.9 GM/DL (ref 2.4–3.5)
GLUCOSE SERPL-MCNC: 248 MG/DL (ref 74–100)
GLUCOSE SERPL-MCNC: 333 MG/DL (ref 70–110)
GLUCOSE SERPL-MCNC: 344 MG/DL (ref 74–100)
GLUCOSE SERPL-MCNC: 467 MG/DL (ref 70–110)
HCT VFR BLD AUTO: 42.8 % (ref 42–52)
HGB BLD-MCNC: 15.2 G/DL (ref 14–18)
HOLD SPECIMEN: NORMAL
IMM GRANULOCYTES # BLD AUTO: 0.04 X10(3)/MCL (ref 0–0.04)
IMM GRANULOCYTES NFR BLD AUTO: 0.5 %
LYMPHOCYTES # BLD AUTO: 3.21 X10(3)/MCL (ref 0.6–4.6)
LYMPHOCYTES NFR BLD AUTO: 37 %
MAGNESIUM SERPL-MCNC: 2 MG/DL (ref 1.6–2.6)
MCH RBC QN AUTO: 32.5 PG (ref 27–31)
MCHC RBC AUTO-ENTMCNC: 35.5 G/DL (ref 33–36)
MCV RBC AUTO: 91.5 FL (ref 80–94)
MONOCYTES # BLD AUTO: 0.65 X10(3)/MCL (ref 0.1–1.3)
MONOCYTES NFR BLD AUTO: 7.5 %
NEUTROPHILS # BLD AUTO: 3.72 X10(3)/MCL (ref 2.1–9.2)
NEUTROPHILS NFR BLD AUTO: 42.9 %
NRBC BLD AUTO-RTO: 0 %
PHOSPHATE SERPL-MCNC: 2.6 MG/DL (ref 2.3–4.7)
PLATELET # BLD AUTO: 296 X10(3)/MCL (ref 130–400)
PMV BLD AUTO: 10.4 FL (ref 7.4–10.4)
POCT GLUCOSE: 146 MG/DL (ref 70–110)
POCT GLUCOSE: 218 MG/DL (ref 70–110)
POCT GLUCOSE: 241 MG/DL (ref 70–110)
POCT GLUCOSE: 245 MG/DL (ref 70–110)
POCT GLUCOSE: 256 MG/DL (ref 70–110)
POCT GLUCOSE: 333 MG/DL (ref 70–110)
POCT GLUCOSE: 467 MG/DL (ref 70–110)
POTASSIUM SERPL-SCNC: 3.4 MMOL/L (ref 3.5–5.1)
POTASSIUM SERPL-SCNC: 3.7 MMOL/L (ref 3.5–5.1)
PROT SERPL-MCNC: 5.9 GM/DL (ref 6.4–8.3)
RBC # BLD AUTO: 4.68 X10(6)/MCL (ref 4.7–6.1)
SODIUM SERPL-SCNC: 130 MMOL/L (ref 136–145)
SODIUM SERPL-SCNC: 133 MMOL/L (ref 136–145)
WBC # BLD AUTO: 8.67 X10(3)/MCL (ref 4.5–11.5)

## 2024-09-09 PROCEDURE — 80053 COMPREHEN METABOLIC PANEL: CPT

## 2024-09-09 PROCEDURE — 85025 COMPLETE CBC W/AUTO DIFF WBC: CPT

## 2024-09-09 PROCEDURE — 84100 ASSAY OF PHOSPHORUS: CPT

## 2024-09-09 PROCEDURE — 80048 BASIC METABOLIC PNL TOTAL CA: CPT

## 2024-09-09 PROCEDURE — 94761 N-INVAS EAR/PLS OXIMETRY MLT: CPT

## 2024-09-09 PROCEDURE — 83735 ASSAY OF MAGNESIUM: CPT

## 2024-09-09 PROCEDURE — 36415 COLL VENOUS BLD VENIPUNCTURE: CPT

## 2024-09-09 PROCEDURE — 25000003 PHARM REV CODE 250: Performed by: INTERNAL MEDICINE

## 2024-09-09 PROCEDURE — 63600175 PHARM REV CODE 636 W HCPCS

## 2024-09-09 PROCEDURE — 86341 ISLET CELL ANTIBODY: CPT

## 2024-09-09 PROCEDURE — 63700000 PHARM REV CODE 250 ALT 637 W/O HCPCS

## 2024-09-09 PROCEDURE — 21400001 HC TELEMETRY ROOM

## 2024-09-09 PROCEDURE — 25000003 PHARM REV CODE 250

## 2024-09-09 RX ORDER — INSULIN GLARGINE 100 [IU]/ML
20 INJECTION, SOLUTION SUBCUTANEOUS 2 TIMES DAILY
Status: DISCONTINUED | OUTPATIENT
Start: 2024-09-09 | End: 2024-09-10

## 2024-09-09 RX ORDER — POTASSIUM CHLORIDE 20 MEQ/1
20 TABLET, EXTENDED RELEASE ORAL ONCE
Status: DISCONTINUED | OUTPATIENT
Start: 2024-09-09 | End: 2024-09-09

## 2024-09-09 RX ORDER — POTASSIUM CHLORIDE 7.45 MG/ML
10 INJECTION INTRAVENOUS
Status: COMPLETED | OUTPATIENT
Start: 2024-09-09 | End: 2024-09-09

## 2024-09-09 RX ORDER — INSULIN ASPART 100 [IU]/ML
10 INJECTION, SOLUTION INTRAVENOUS; SUBCUTANEOUS
Status: DISCONTINUED | OUTPATIENT
Start: 2024-09-09 | End: 2024-09-10

## 2024-09-09 RX ADMIN — INSULIN GLARGINE 20 UNITS: 100 INJECTION, SOLUTION SUBCUTANEOUS at 08:09

## 2024-09-09 RX ADMIN — MUPIROCIN: 20 OINTMENT TOPICAL at 09:09

## 2024-09-09 RX ADMIN — INSULIN GLARGINE 20 UNITS: 100 INJECTION, SOLUTION SUBCUTANEOUS at 09:09

## 2024-09-09 RX ADMIN — INSULIN ASPART 4 UNITS: 100 INJECTION, SOLUTION INTRAVENOUS; SUBCUTANEOUS at 12:09

## 2024-09-09 RX ADMIN — INSULIN ASPART 10 UNITS: 100 INJECTION, SOLUTION INTRAVENOUS; SUBCUTANEOUS at 08:09

## 2024-09-09 RX ADMIN — INSULIN ASPART 10 UNITS: 100 INJECTION, SOLUTION INTRAVENOUS; SUBCUTANEOUS at 05:09

## 2024-09-09 RX ADMIN — MUPIROCIN: 20 OINTMENT TOPICAL at 08:09

## 2024-09-09 RX ADMIN — INSULIN ASPART 4 UNITS: 100 INJECTION, SOLUTION INTRAVENOUS; SUBCUTANEOUS at 05:09

## 2024-09-09 RX ADMIN — FLUCONAZOLE 200 MG: 100 TABLET ORAL at 09:09

## 2024-09-09 RX ADMIN — INSULIN ASPART 10 UNITS: 100 INJECTION, SOLUTION INTRAVENOUS; SUBCUTANEOUS at 12:09

## 2024-09-09 RX ADMIN — POTASSIUM CHLORIDE 10 MEQ: 7.46 INJECTION, SOLUTION INTRAVENOUS at 12:09

## 2024-09-09 RX ADMIN — INSULIN ASPART 4 UNITS: 100 INJECTION, SOLUTION INTRAVENOUS; SUBCUTANEOUS at 08:09

## 2024-09-09 RX ADMIN — ENOXAPARIN SODIUM 40 MG: 40 INJECTION SUBCUTANEOUS at 05:09

## 2024-09-09 RX ADMIN — INSULIN ASPART 3 UNITS: 100 INJECTION, SOLUTION INTRAVENOUS; SUBCUTANEOUS at 09:09

## 2024-09-09 RX ADMIN — POTASSIUM CHLORIDE 10 MEQ: 7.46 INJECTION, SOLUTION INTRAVENOUS at 11:09

## 2024-09-09 RX ADMIN — CEFAZOLIN 2 G: 2 INJECTION, POWDER, FOR SOLUTION INTRAMUSCULAR; INTRAVENOUS at 04:09

## 2024-09-09 NOTE — NURSING TRANSFER
Nursing Transfer Note      9/9/2024   3:53 AM    Nurse giving handoff: Landon TENORIO  Nurse receiving handoff: Nena TENORIO    Reason patient is being transferred: downgrade    Transfer To: 636 Med/Surg  Transfer From: 419 ICU    Transfer via wheelchair    Transported by Landon TENORIO    4eyes on Skin: yes    Medicines sent: none    Any special needs or follow-up needed: none    Patient belongings transferred with patient: Yes    Chart send with patient: Yes    Upon arrival to floor: patient oriented to room, call bell in reach, and bed in lowest position

## 2024-09-09 NOTE — PROGRESS NOTES
[facility] Medicine Wards   History & Physical Note     Resident Team: [facility] Medicine List 4  Attending Physician: Haritha Sharpe MD  Resident: Raina  Intern: Idris     Date of Admit: 9/7/2024    Chief Complaint:     Eating Disorder (C/o losing weight, no appetite, rash to tongue and mouth for a few days. On antibiotics due to infection of finger. Cbg 464. No hx of dm), Rash, and Hyperglycemia (Cbg 464)       Subjective:      History of Present Illness:  Flako Mccray is a 44 y.o. male with a history of HTN who presented to [facility] ED on 9/7/2024  with complaint of complaints of fatigue and dry mouth x 3 days. Patient additionally reports nausea and billious non-bloody vomiting that started earlier today. He also reports trouble swallowing with a sand paper like sensation in his mouth and throat when he eats and swallows that started in 12/2023. Since then, patient states he lost over 45 lbs unintentionally. He denies fevers, chills, HA, changes in vision, chest pain, shortness of breath, abdominal pain, dysuria, and bowel changes. Patient admits to smoking about a half a pack of cigarettes for the last 3 months which was increased from 2-3 cigarettes since April of this year. Denies EtOH and recreational drug use. Patient is not aware of any chronic diseases he was diagnosed with previously and does not take any daily prescription medications. Of note, patient was placed on bactrim for hang nail that started to bother him about a week and a half ago. He only took 3 days of bactrim BID but did not take dose today.     Past Medical History:   has no past medical history on file.     Past Surgical History:   has no past surgical history on file.     Family History:  family history includes Aneurysm in his paternal grandmother and sister; Cancer in his paternal aunt, paternal cousin, paternal grandfather, and paternal uncle; Diabetes in his father, maternal uncle, and mother; Hypertension in his father; Stroke in his  paternal grandfather.     Social History:   reports that he has quit smoking. His smoking use included cigarettes. He started smoking about 11 years ago. He has a 11.7 pack-year smoking history. He has never used smokeless tobacco. He reports current alcohol use. He reports that he does not currently use drugs after having used the following drugs: Marijuana.     Allergies:  has No Known Allergies.     Home Medications:  Prior to Admission medications    Medication Sig Start Date End Date Taking? Authorizing Provider   fluticasone propionate (FLONASE) 50 mcg/actuation nasal spray 1 spray (50 mcg total) by Each Nostril route Daily. 10/10/23   Milana Shields FNP   loratadine (CLARITIN) 10 mg tablet Take 1 tablet (10 mg total) by mouth once daily. 10/10/23   Milana Shields FNP       Review of Systems:  Review of Systems   Constitutional:  Positive for weight loss. Negative for chills and fever.   HENT:  Negative for congestion, sinus pain and sore throat.         Dysphagia   Eyes:  Negative for blurred vision and double vision.   Respiratory:  Negative for cough, sputum production, shortness of breath and wheezing.    Cardiovascular:  Negative for chest pain, palpitations and leg swelling.   Gastrointestinal:  Positive for nausea and vomiting. Negative for abdominal pain.   Genitourinary:  Negative for dysuria.   Musculoskeletal:  Negative for myalgias.   Neurological:  Negative for dizziness, focal weakness, seizures and weakness.        Objective:     Vital Signs (Most Recent):  Temp: 98.7 °F (37.1 °C) (09/09/24 0338)  Pulse: 93 (09/09/24 0338)  Resp: 18 (09/09/24 0338)  BP: 102/69 (09/09/24 0338)  SpO2: 99 % (09/09/24 0338) Vital Signs (24h Range):  Temp:  [98.1 °F (36.7 °C)-98.7 °F (37.1 °C)] 98.7 °F (37.1 °C)  Pulse:  [] 93  Resp:  [17-25] 18  SpO2:  [98 %-100 %] 99 %  BP: ()/(69-79) 102/69     Physical Examination:  Physical Exam  Constitutional:       Appearance: Normal appearance.   HENT:       Head: Normocephalic and atraumatic.      Nose: Nose normal.      Mouth/Throat:      Mouth: Mucous membranes are moist.      Comments: White plaque present   Eyes:      Conjunctiva/sclera: Conjunctivae normal.      Pupils: Pupils are equal, round, and reactive to light.   Cardiovascular:      Rate and Rhythm: Normal rate and regular rhythm.      Pulses: Normal pulses.      Heart sounds: No murmur heard.  Pulmonary:      Effort: Pulmonary effort is normal. No respiratory distress.      Breath sounds: No wheezing.   Chest:      Chest wall: No tenderness.   Abdominal:      General: Abdomen is flat. Bowel sounds are normal. There is no distension.      Palpations: Abdomen is soft.      Tenderness: There is no abdominal tenderness.   Musculoskeletal:         General: No swelling. Normal range of motion.      Cervical back: Normal range of motion.   Skin:     General: Skin is warm.   Neurological:      General: No focal deficit present.      Mental Status: He is alert and oriented to person, place, and time.          Laboratory:  Most Recent Data:  CBC:   Recent Labs   Lab 09/07/24  1646 09/08/24  0519 09/09/24  0425   WBC 13.95* 13.06* 8.67   HGB 17.6 16.4 15.2   HCT 52.8* 45.8 42.8    343 296     CMP:   Recent Labs   Lab 09/09/24  0425   CALCIUM 9.0   ALBUMIN 3.0*   *   K 3.4*   CO2 21*      BUN 10.2   CREATININE 0.88   ALKPHOS 69   ALT 5   AST 10   BILITOT 0.4         Microbiology Data:  N/A    Other Results:  EKG (my interpretation): sinus tachycardia    Radiology:  Imaging Results              X-Ray Chest AP Portable (Final result)  Result time 09/07/24 19:47:39      Final result by Andrzej Conway MD (09/07/24 19:47:39)                   Impression:      NO ACUTE CARDIOPULMONARY PROCESS IDENTIFIED.      Electronically signed by: Andrzej Conway  Date:    09/07/2024  Time:    19:47               Narrative:    EXAMINATION:  XR CHEST AP PORTABLE    CLINICAL HISTORY:  Type 2 diabetes mellitus with  ketoacidosis without coma    TECHNIQUE:  One view    COMPARISON:  October 10, 2023.    FINDINGS:  Cardiopericardial silhouette is within normal limits. Lungs are without dense focal or segmental consolidation, congestive process, pleural effusions or pneumothorax.                                       Lines/Drains/Airways       Peripheral Intravenous Line  Duration                  Peripheral IV - Single Lumen 09/07/24 1655 20 G Left Antecubital 1 day         Peripheral IV - Single Lumen 09/07/24 1844 20 G Anterior;Proximal;Right Forearm 1 day                     Assessment & Plan:     New onset diabetes  -A1c on presentation 14  -Initially on insulin gtt but able to be transitioned to subcutaneous insulin  -Currently on Glargine 20 units BID and Aspart 10 units with meals  -Started on sliding scale insulin  -Currently on diabetic diet; continue to monitor for worsening nausea/vomiting  -BABITA antibody pending to assess type 1 vs type 2  -Will need diabetic education    Dysphagia  Weight loss  -Suspect due to diabetes   -Per EPIC, 50lb weight loss since January 2024  -Will start on Fluconazole   -Will consult GI for inpatient vs outpatient scope    Hx of bipolar, PTSD, anxiety  -Outside records show patient on Sertraline 50 mg daily   -Will restart while admitted      CODE STATUS: Full Code  Access: Peripheral  Antibiotics: N/A  Diet: Diabetic  DVT Prophylaxis: Lovenox     Disposition: day 2 of admission for DKA with new onset diabetes. Currently awaiting GI evaluation for dysphagia and weight loss.     Stevie Paris MD  [facility], [facility]

## 2024-09-09 NOTE — PROGRESS NOTES
Inpatient Nutrition Assessment    Admit Date: 9/7/2024   Total duration of encounter: 2 days   Patient Age: 44 y.o.    Nutrition Recommendation/Prescription     Carb consistent diet  Monitor Weight Weekly   Internal referral to nutrition for diabetic class    Communication of Recommendations: reviewed with patient    Nutrition Assessment     Malnutrition Assessment/Nutrition-Focused Physical Exam    Malnutrition Context: chronic illness (09/09/24 1116)  Malnutrition Level:  (does not meet criteria) (09/09/24 1116)     Weight Loss (Malnutrition): greater than 10% in 6 months (09/09/24 1116)  Subcutaneous Fat (Malnutrition):  (does not meet criteria) (09/09/24 1116)           Muscle Mass (Malnutrition):  (does not meet criteria) (09/09/24 1116)                                   A minimum of two characteristics is recommended for diagnosis of either severe or non-severe malnutrition.    Chart Review    Reason Seen: continuous nutrition monitoring and malnutrition screening tool (MST)    Malnutrition Screening Tool Results   Have you recently lost weight without trying?: Yes: 34 lbs or more  Have you been eating poorly because of a decreased appetite?: Yes   MST Score: 5   Diagnosis:  New onset DM, DKA, KATHY, unintentional weight loss d/t oropharyngeal vs esophageal candidiasis    Relevant Medical History: HTN, Bipolar, PTSD, Anxiety, Depression    Scheduled Medications:  enoxparin, 40 mg, Daily  fluconazole, 200 mg, QHS  insulin aspart U-100, 10 Units, TIDWM  insulin glargine U-100, 20 Units, BID  mupirocin, , BID  potassium chloride, 10 mEq, Q1H    Continuous Infusions:   PRN Medications:  dextrose 10%, 12.5 g, PRN  dextrose 10%, 25 g, PRN  glucagon (human recombinant), 1 mg, PRN  glucose, 16 g, PRN  glucose, 24 g, PRN  insulin aspart U-100, 0-10 Units, QID (AC + HS) PRN  labetalol, 10 mg, Q4H PRN  naloxone, 0.02 mg, PRN  ondansetron, 8 mg, Q8H PRN  sodium chloride 0.9%, 10 mL, PRN    Calorie Containing IV Medications:  "no significant kcals from medications at this time    Recent Labs   Lab 09/07/24  1646 09/07/24  2117 09/08/24  0110 09/08/24  0519 09/08/24  0809 09/09/24  0028 09/09/24  0425   * 133* 140 138 135* 130* 133*   K 5.6* 4.7 3.8 3.6 3.7 3.7 3.4*   CALCIUM 10.1 9.3 9.6 9.2 9.0 8.9 9.0   PHOS 5.0*  --  1.8*  --   --   --  2.6   MG 2.50  --  2.40  --   --   --  2.00   CL 91* 104 111* 111* 108* 103 105   CO2 10* 12* 17* 18* 20* 20* 21*   BUN 19.0 16.2 13.4 13.2 12.5 11.1 10.2   CREATININE 2.03* 1.75* 1.51* 1.26* 1.29* 1.06 0.88   EGFRNORACEVR 41 49 58 >60 >60 >60 >60   GLUCOSE 574* 484* 132* 170* 187* 344* 248*   BILITOT 0.6  --  0.5  --   --   --  0.4   ALKPHOS 104  --  95  --   --   --  69   ALT 11  --  9  --   --   --  5   AST 15  --  9  --   --   --  10   ALBUMIN 4.2  --  3.9  --   --   --  3.0*   CRP  --  2.10  --   --   --   --   --    HGBA1C >14.0*  --   --   --   --   --   --    WBC 13.95*  --   --  13.06*  --   --  8.67   HGB 17.6  --   --  16.4  --   --  15.2   HCT 52.8*  --   --  45.8  --   --  42.8     Nutrition Orders:  Diet diabetic 2000 Calorie      Appetite/Oral Intake: good/% of meals  Factors Affecting Nutritional Intake: difficulty/impaired swallowing  Social Needs Impacting Access to Food: none identified  Food/Jew/Cultural Preferences: none reported  Food Allergies: no known food allergies  Last Bowel Movement: 09/06/24  Wound(s):  skin intact    Comments    9/9/24 -- Pt reports good appetite, reports po intake up/down over the last couple weeks related to painful swallow describing as "like eating sand" -- improved; reports wt loss since March, significant --> most likely related to uncontrolled DM; Hgb A1C > 14 -- newly diagnosed DM this admit, diet education provided, would benefit from referral to nutrition for further outpatient diabetic teachings    Anthropometrics    Height: 5' 11.5" (181.6 cm), Height Method: Stated  Last Weight: 68.8 kg (151 lb 9.6 oz) (09/09/24 1114), Weight " Method: Bed Scale  BMI (Calculated): 20.9  BMI Classification: normal (BMI 18.5-24.9)        Ideal Body Weight (IBW), Male: 175 lb     % Ideal Body Weight, Male (lb): 83.15 %                 Usual Body Weight (UBW), k.5 kg  % Usual Body Weight: 79.66  % Weight Change From Usual Weight: -20.5 %  Usual Weight Provided By: patient and EMR weight history    Wt Readings from Last 5 Encounters:   24 68.8 kg (151 lb 9.6 oz)   24 88.8 kg (195 lb 12.3 oz)   24 87.8 kg (193 lb 9 oz)   23 83.9 kg (184 lb 15.5 oz)   10/10/23 86.1 kg (189 lb 12.8 oz)     Weight Change(s) Since Admission:   Wt Readings from Last 1 Encounters:   24 1114 68.8 kg (151 lb 9.6 oz)   24 0330 66 kg (145 lb 8.1 oz)   24 1612 66 kg (145 lb 8.1 oz)   Admit Weight: 66 kg (145 lb 8.1 oz) (24 1612), Weight Method: Standard Scale    Estimated Needs    Weight Used For Calorie Calculations: 68.9 kg (151 lb 14.4 oz)  Energy Calorie Requirements (kcal): 1930 - 2070 kcal (28 - 30 kcal/kg)  Energy Need Method: Kcal/kg  Weight Used For Protein Calculations: 69.9 kg (154 lb 1.6 oz)  Protein Requirements: 69 - 75 gm (1-1.1 gm/kg)  Fluid Requirements (mL): 0313-9731 ml (1ml/kcal)  CHO Requirement: 200 gm daily     Enteral Nutrition     Patient not receiving enteral nutrition at this time.    Parenteral Nutrition     Patient not receiving parenteral nutrition support at this time.    Evaluation of Received Nutrient Intake    Calories: meeting estimated needs  Protein: meeting estimated needs    Patient Education     Education Provided: diabetic diet  Teaching Method: explanation and printed materials  Comprehension: verbalizes understanding  Barriers to Learning: none evident  Expected Compliance: good  Comments: All questions were answered and dietitian's contact information was provided.     Nutrition Diagnosis     PES: Altered nutrition related laboratory values related to lack of prior nutrition-related education  as evidenced by Diabetes. (new)     Nutrition Interventions     Intervention(s): modified composition of meals/snacks, purpose of nutrition education, and collaboration with other providers    Goal: Verbalize understanding of diet by discharge. (new)  Goal: Maintain weight throughout hospitalization. (new)    Nutrition Goals & Monitoring     Dietitian will monitor: food and beverage intake, weight, food/nutrition knowledge skill, and glucose/endocrine profile  Discharge planning: continue Diabetic diet  Nutrition Risk/Follow-Up: moderate (follow-up in 3-5 days)   Please consult if re-assessment needed sooner.

## 2024-09-09 NOTE — CONSULTS
"Gastroenterology/Hepatology Initial Consult Note    Patient Name: Flako Mccray  Age: 44 y.o.  : 1980  MRN: [id number]  Admission Date: 2024    Reason for Consult:      Dysphagia with significant weight loss, oropharyngeal vs esophageal candidiasis    HPI     Flako Mccray is a 44 y.o. male with PMHx of HTN, prior PCP use, bipolar 1, PTSD, anxiety, and depression who was admitted to the hospital on 24 for treatment of DKA in the setting of new-onset diabetes. GI is being consulted for dysphagia in the setting of alarm features and oral thrush. Patient with dry mouth reports mild difficulty swallowing solids associated with significant pain. Symptoms began in 2023 and have progressively worsened over the last few weeks. He also reports unintentionally losing 45 lbs since that time. Patient reports having a good appetite, but decreased food intake due to the pain he experiences when eating. He describes a sandpaper-like sensation in his mouth and throat and states he must chew his food into "grains," otherwise he is unable to swallow it. Of note, symptoms are not present with liquids. Reports intermittent heartburn and regurgitation that occurs hours after he eats and feels like "burning in his chest." He denies a history of acid reflux or GERD. While incarcerated this past , patient reports he had a 4-5 day episode where he felt as though a "rubber band was squeezing his passage" resulting in an inability to eat anything or to speak. During that time, patient states he was very hoarse and his voice was unrecognizable. States he lost around 10 lbs during that period as well. He reports he was treated with "medical mouthwash" and ibuprofen with minimal relief. Acute symptoms resolved spontaneously while mouth/throat pain remained. He has not had any similar episodes since and denies current hoarseness, change in voice or difficulty speaking. He also denies cough, hemoptysis, " hematemesis, choking, chest pain, difficulty initiating swallow, globus sensation. Patient endorses currently smoking 0.5 ppd. He began smoking 0.5 ppd at the age of 25, quit smoking when he was 32, and remained tobacco free until last month. He denies alcohol and elicit drug use. At the time of arrival, oral thrush was noted on physical examination and he was empirically started on Fluconazole for the treatment of esophageal candidiasis. No prior EGD or colonoscopy.     History reviewed. No pertinent past medical history.     History reviewed. No pertinent surgical history.     Family History   Problem Relation Name Age of Onset    Diabetes Mother      Hypertension Father      Diabetes Father      Aneurysm Sister      Diabetes Maternal Uncle      Cancer Paternal Aunt      Cancer Paternal Uncle      Aneurysm Paternal Grandmother      Cancer Paternal Grandfather          prostate    Stroke Paternal Grandfather      Cancer Paternal Cousin         Social History     Tobacco Use    Smoking status: Former     Current packs/day: 1.00     Average packs/day: 1 pack/day for 11.7 years (11.7 ttl pk-yrs)     Types: Cigarettes     Start date: 2013    Smokeless tobacco: Never   Substance Use Topics    Alcohol use: Yes     Comment: liquor 2 x a week         Review of patient's allergies indicates:  No Known Allergies     Medications Prior to Admission   Medication Sig Dispense Refill Last Dose    fluticasone propionate (FLONASE) 50 mcg/actuation nasal spray 1 spray (50 mcg total) by Each Nostril route Daily. 16 g 1     loratadine (CLARITIN) 10 mg tablet Take 1 tablet (10 mg total) by mouth once daily. 30 tablet 1          INPATIENT MEDICATIONS    Scheduled Meds:   enoxparin  40 mg Subcutaneous Daily    fluconazole  200 mg Oral QHS    insulin aspart U-100  10 Units Subcutaneous TIDWM    insulin glargine U-100  20 Units Subcutaneous BID    mupirocin   Nasal BID    potassium chloride  10 mEq Intravenous Q1H     Continuous  Infusions:  PRN Meds:    Current Facility-Administered Medications:     dextrose 10%, 12.5 g, Intravenous, PRN    dextrose 10%, 25 g, Intravenous, PRN    glucagon (human recombinant), 1 mg, Intramuscular, PRN    glucose, 16 g, Oral, PRN    glucose, 24 g, Oral, PRN    insulin aspart U-100, 0-10 Units, Subcutaneous, QID (AC + HS) PRN    labetalol, 10 mg, Intravenous, Q4H PRN    naloxone, 0.02 mg, Intravenous, PRN    ondansetron, 8 mg, Intravenous, Q8H PRN    sodium chloride 0.9%, 10 mL, Intravenous, PRN          Review of Systems:       Review of Systems   Constitutional:  Positive for chills, diaphoresis, fatigue and unexpected weight change. Negative for appetite change, fever and night sweats.   HENT:  Positive for mouth dryness, sore throat and trouble swallowing. Negative for voice change.    Eyes:  Negative for visual disturbance.   Respiratory:  Negative for cough, choking, chest tightness, shortness of breath, wheezing and stridor.    Cardiovascular:  Negative for chest pain and palpitations.   Gastrointestinal:  Positive for reflux. Negative for abdominal pain, change in bowel habit, constipation, diarrhea, nausea and vomiting.   Genitourinary:  Negative for dysuria and hematuria.   Musculoskeletal:  Negative for arthralgias, back pain and myalgias.   Allergic/Immunologic: Negative for food allergies.   Neurological:  Negative for dizziness, speech difficulty, weakness, numbness and headaches.   Psychiatric/Behavioral:  Negative for confusion.           Objective:       VITAL SIGNS: 24 HR MIN & MAX LAST    Temp  Min: 98.1 °F (36.7 °C)  Max: 98.7 °F (37.1 °C)  98.4 °F (36.9 °C)        BP  Min: 102/69  Max: 119/76  119/76     Pulse  Min: 78  Max: 93  78     Resp  Min: 17  Max: 22  17    SpO2  Min: 99 %  Max: 100 %  100 %        Physical Exam  Vitals and nursing note reviewed.   Constitutional:       General: He is not in acute distress.     Appearance: Normal appearance. He is normal weight. He is not  ill-appearing.   HENT:      Head: Normocephalic and atraumatic.      Mouth/Throat:      Mouth: Mucous membranes are moist.      Pharynx: Oropharynx is clear. Uvula midline.      Tonsils: No tonsillar exudate.      Comments: Minimal thrush present  Eyes:      Extraocular Movements: Extraocular movements intact.      Conjunctiva/sclera: Conjunctivae normal.      Pupils: Pupils are equal, round, and reactive to light.   Cardiovascular:      Rate and Rhythm: Normal rate and regular rhythm.      Pulses: Normal pulses.   Pulmonary:      Effort: Pulmonary effort is normal. No respiratory distress.      Breath sounds: Normal breath sounds.   Abdominal:      General: Abdomen is flat. Bowel sounds are normal. There is no distension.      Palpations: Abdomen is soft.   Musculoskeletal:         General: Normal range of motion.      Cervical back: Normal range of motion and neck supple. No tenderness.   Skin:     General: Skin is warm and dry.      Coloration: Skin is not pale.      Findings: No rash.   Neurological:      General: No focal deficit present.      Mental Status: He is alert and oriented to person, place, and time.   Psychiatric:         Mood and Affect: Mood normal.         Behavior: Behavior normal.         Thought Content: Thought content normal.         Judgment: Judgment normal.          LABS:      Recent Labs   Lab 09/07/24  1646 09/08/24  0519 09/09/24  0425   WBC 13.95* 13.06* 8.67   HGB 17.6 16.4 15.2   HCT 52.8* 45.8 42.8    343 296   MCV 95.0* 91.1 91.5       Recent Labs   Lab 09/07/24  1646 09/08/24  0519 09/09/24  0425   HGB 17.6 16.4 15.2   HCT 52.8* 45.8 42.8        Recent Labs   Lab 09/08/24  0110 09/08/24  0519 09/08/24  0809 09/09/24  0028 09/09/24  0425    138 135* 130* 133*   K 3.8 3.6 3.7 3.7 3.4*   CO2 17* 18* 20* 20* 21*   BUN 13.4 13.2 12.5 11.1 10.2   CREATININE 1.51* 1.26* 1.29* 1.06 0.88   BILITOT 0.5  --   --   --  0.4   ALKPHOS 95  --   --   --  69   AST 9  --   --   --  10  "  ALT 9  --   --   --  5   LABPROT 7.7  --   --   --  5.9*   ALBUMIN 3.9  --   --   --  3.0*        No results for input(s): "INR", "PROTIME", "PTT" in the last 168 hours.     No results for input(s): "IRON", "FERRITIN" in the last 168 hours.       IMAGING:   X-Ray Chest AP Portable    Result Date: 9/7/2024  EXAMINATION: XR CHEST AP PORTABLE CLINICAL HISTORY: Type 2 diabetes mellitus with ketoacidosis without coma TECHNIQUE: One view COMPARISON: October 10, 2023. FINDINGS: Cardiopericardial silhouette is within normal limits. Lungs are without dense focal or segmental consolidation, congestive process, pleural effusions or pneumothorax.     NO ACUTE CARDIOPULMONARY PROCESS IDENTIFIED. Electronically signed by: Andrzej Conway Date:    09/07/2024 Time:    19:47        Assessment / Plan:     Odynophagia and dysphagia with unintentional weight loss   Oropharyngeal vs esophageal candidiasis  - Patient with DKA in the setting of new-onset diabetes, A1c >14  - Minimal oral thrush present on examination  - HIV negative  - Given 1 dose 200 mg Fluconazole IV yesterday then transitioned to Fluconazole 200 mg PO QHS  - Will plan for EGD tomorrow morning  - NPO at midnight  - Agree with current management    Divine Hancock MD  Gastroenterology and Hepatology  [facility], PGY-1    "

## 2024-09-09 NOTE — MEDICAL/APP STUDENT
[facility] Progress Note    Patient Name: Flako Mccray  MRN: [id number]  Admission Date: 9/7/2024  Hospital Length of Stay: 2 days  Code Status: Full Code  Attending Provider: Haritha Sharpe MD  Primary Care Provider: Arlyn Zendejas FNP     Subjective:      Brief HPI:  Flako Mccray is a 45 yo M w PMHx of HTN, prior PCP use, bipolar 1, PTSD, anxiety and depression who presents to [facility] ED on 9/7/24 with complaints of fatigue and dry mouth x 3 days. Patient additionally reports nausea and billious non-bloody vomiting that started earlier today. He also reports trouble swallowing with a sand paper like sensation in his mouth and throat when he eats and swallows that started in 12/2023. Since then, patient states he lost over 45 lbs unintentionally. He denies fevers, chills, HA, changes in vision, chest pain, shortness of breath, abdominal pain, dysuria, and bowel changes. Patient admits to smoking about a half a pack of cigarettes for the last 3 months which was increased from 2-3 cigarettes since April of this year. Denies EtOH and recreational drug use. Patient is not aware of any chronic diseases he was diagnosed with previously and does not take any daily prescription medications. Of note, patient was placed on bactrim for hang nail that started to bother him about a week and a half ago. He only took 3 days of bactrim BID but did not take dose today.      Upon ED arrival, patient was tachycardic , mildly hypertensive /111 and mildly tachypneic RR 20 but afebrile on RA. Lab work significant for mild neutrophil predominant leukocytosis WBC 13.95, hyponatremic 128, hyperkalemic 5.6, KATHY BUN/Cr 19/20.3, hyperglycemic 574 and metabolic acidosis with bicarb 10 and anion gap 27.  A1xc >14 and BHB 10.4. UA consistent with glucosuria and ketonuria 4+. ABG consistent with metabolic acidosis pH 7.1. UDS, HIV and troponin negative. Internal medicine consulted for DKA.     Interval  "History: Patient complains of a "scratchy" sensation in his throat while swallowing, as well as intermittent diaphoresis and bilateral leg cramping. He denies nausea, vomiting, chest pain, SOB, diarrhea, abdominal pain, dysuria, and hematuria.    Review of Systems:  The remainder of the 14 point ROS is noncontributory or negative unless mentioned/reviewed above.     Objective:     Vital Signs:  Vital Signs (Most Recent):  Temp: 98.4 °F (36.9 °C) (09/09/24 0712)  Pulse: 78 (09/09/24 0726)  Resp: 17 (09/09/24 0712)  BP: 119/76 (09/09/24 0712)  SpO2: 100 % (09/09/24 0726)  Body mass index is 20.01 kg/m².  Weight: 66 kg (145 lb 8.1 oz) Vital Signs (24h Range):  Temp:  [98.1 °F (36.7 °C)-98.7 °F (37.1 °C)] 98.4 °F (36.9 °C)  Pulse:  [] 78  Resp:  [17-25] 17  SpO2:  [99 %-100 %] 100 %  BP: (102-133)/(69-79) 119/76       Input/output:     Intake/Output Summary (Last 24 hours) at 9/9/2024 0829  Last data filed at 9/9/2024 0030  Gross per 24 hour   Intake 1395.36 ml   Output 2550 ml   Net -1154.64 ml       Physical Examination:  General:  Well developed, well nourished, no acute respiratory distress  Head: Normocephalic, atraumatic  Eyes: PERRL, anicteric sclera  Throat: No posterior pharyngeal erythema or exudate, no tonsillar exudate  Neck: supple, normal ROM, no JVD  CVS:  RRR, S1 and S2 normal, no murmurs, no added heart sounds, rubs, gallops, regular peripheral pulses, and no peripheral edema  Resp:  Lungs clear to auscultation bilaterally, no wheezes, rales, or rhonci  GI:  Abdomen soft, non-tender, non-distended, normoactive bowel sounds  MSK:  Full range of motion, no obvious deformities   Skin:  No rashes, ulcers, erythema  Neuro:  Alert and oriented x3, moves all extremities equally  Psych:  Appropriate mood and affect       Lines/Drains/Airways       None                    Laboratory:    Recent Labs   Lab 09/07/24  1646 09/08/24  0519 09/09/24  0425   WBC 13.95* 13.06* 8.67   RBC 5.56 5.03 4.68*   HGB 17.6 " 16.4 15.2   HCT 52.8* 45.8 42.8   MCV 95.0* 91.1 91.5   MCH 31.7* 32.6* 32.5*   MCHC 33.3 35.8 35.5   RDW 11.4* 11.3* 11.3*    343 296   MPV 11.1* 9.7 10.4      Recent Labs   Lab 09/07/24  1646 09/07/24  1923 09/07/24  2117 09/08/24  0110 09/08/24  0519 09/08/24  0809 09/09/24  0028 09/09/24  0425   *  --    < > 140   < > 135* 130* 133*   K 5.6*  --    < > 3.8   < > 3.7 3.7 3.4*   CO2 10*  --    < > 17*   < > 20* 20* 21*   BUN 19.0  --    < > 13.4   < > 12.5 11.1 10.2   CREATININE 2.03*  --    < > 1.51*   < > 1.29* 1.06 0.88   CALCIUM 10.1  --    < > 9.6   < > 9.0 8.9 9.0   PH  --  7.100*  --   --   --   --   --   --    MG 2.50  --   --  2.40  --   --   --  2.00   ALBUMIN 4.2  --   --  3.9  --   --   --  3.0*   ALKPHOS 104  --   --  95  --   --   --  69   ALT 11  --   --  9  --   --   --  5   AST 15  --   --  9  --   --   --  10   BILITOT 0.6  --   --  0.5  --   --   --  0.4    < > = values in this interval not displayed.        Other Results:  Estimated Creatinine Clearance: 101 mL/min (based on SCr of 0.88 mg/dL).    Current Medications:     Infusions:        Scheduled:   ceFAZolin (Ancef) IV (PEDS and ADULTS)  2 g Intravenous Q8H    enoxparin  40 mg Subcutaneous Daily    fluconazole  200 mg Oral QHS    insulin aspart U-100  10 Units Subcutaneous TIDWM    insulin glargine U-100  20 Units Subcutaneous BID    mupirocin   Nasal BID         PRN:   ceFAZolin 2 g in D5W 100 mL IVPB (MB+)    enoxaparin injection 40 mg    fluconazole tablet 200 mg    insulin aspart U-100 injection 10 Units    insulin glargine U-100 (Lantus) injection 20 Units    mupirocin 2 % ointment        Microbiology Data:  Microbiology Results (last 7 days)       ** No results found for the last 168 hours. **             Antibiotics and Day Number of Therapy:  Antibiotics (From admission, onward)      Start     Stop Route Frequency Ordered    09/08/24 0900  mupirocin 2 % ointment         09/13/24 0859 Nasl 2 times daily 09/08/24 0620     24  ceFAZolin 2 g in D5W 100 mL IVPB (MB+)         24 IV Every 8 hours (non-standard times) 24             Imaging:  X-Ray Chest AP Portable  Narrative: EXAMINATION:  XR CHEST AP PORTABLE    CLINICAL HISTORY:  Type 2 diabetes mellitus with ketoacidosis without coma    TECHNIQUE:  One view    COMPARISON:  October 10, 2023.    FINDINGS:  Cardiopericardial silhouette is within normal limits. Lungs are without dense focal or segmental consolidation, congestive process, pleural effusions or pneumothorax.  Impression: NO ACUTE CARDIOPULMONARY PROCESS IDENTIFIED.    Electronically signed by: Andrzej Conway  Date:    2024  Time:    19:47        Assessment & Plan:   DKA  -On arrival: CB  Urine Ketones: 4+ Bicarb: 10  Gap: 27  BHB: 10.4  pH: 7.1   -HbA1c on 24 >14  -Admitted to ICU for DKA protocol with insulin drip; now stepped down to floor and transitioned to subQ insulin  -Today Na 133, K 3.4, Cl 105, bicarb 21, anion gap 7, blood glucose 248  -Optimize subQ insulin dose; currently on Aspart 10 units TID, glargine 20 units BID  -BABITA 65 Ab test in progress to assess for Type 1 vs. Type 2 DM    Mild leukocytosis 13.95 on arrival - Resolved  -WBC 8.67 today    KATHY with Cr 2.03 on arrival - Resolved  -Cr 0.88 today    Unintentional weight loss and dysphagia  -Continue fluconazole 200 mg nightly to cover for esophageal candidiasis  -GI consult today as patient may require EGD    Hypokalemia  -K 3.4 today  -replete electrolytes as needed to maintain K>4, Mg>2, phos>3    Paronychia  -Received IV cefazolin starting on ; discontinued on     Hx of HTN  -Patient reported in ED that he has not been taking any daily home meds  -Blood pressure stable ()/(69-79) over last 24h  -PRN labetalol    Hx of bipolar 1, PTSD, anxiety, depression  -Not on any home psychiatric meds  -Not currently endorsing symptoms    Hx of substance abuse (PCP per chart review)  -UDS negative on  arrival    Hx of tobacco use  -Not currently endorsing withdrawal symptoms    Family history of colorectal cancer (grandfather)  -GI consult today 2/2 dysphagia      CODE STATUS: Full Code   Access: PIV  Antibiotics: none  Diet: Diet diabetic 1800 Calorie  DVT Prophylaxis: Lovenox   GI Prophylaxis: none  Fluids: none      Disposition: Flako Mccray is a 44 y.o. male who is admitted for DKA. Currently optimizing subQ insulin dose. GI consult today for dysphagia.      Noemy Espinoza  [facility] MS4  09/09/2024

## 2024-09-10 ENCOUNTER — ANESTHESIA (OUTPATIENT)
Dept: ENDOSCOPY | Facility: HOSPITAL | Age: 44
End: 2024-09-10
Payer: COMMERCIAL

## 2024-09-10 ENCOUNTER — ANESTHESIA EVENT (OUTPATIENT)
Dept: ENDOSCOPY | Facility: HOSPITAL | Age: 44
End: 2024-09-10
Payer: COMMERCIAL

## 2024-09-10 VITALS
WEIGHT: 151.63 LBS | RESPIRATION RATE: 19 BRPM | DIASTOLIC BLOOD PRESSURE: 86 MMHG | OXYGEN SATURATION: 100 % | TEMPERATURE: 98 F | HEART RATE: 83 BPM | SYSTOLIC BLOOD PRESSURE: 120 MMHG | BODY MASS INDEX: 20.54 KG/M2 | HEIGHT: 72 IN

## 2024-09-10 PROBLEM — E11.10 DKA (DIABETIC KETOACIDOSIS): Status: ACTIVE | Noted: 2024-09-10

## 2024-09-10 LAB
ALBUMIN SERPL-MCNC: 2.7 G/DL (ref 3.5–5)
ALBUMIN/GLOB SERPL: 1 RATIO (ref 1.1–2)
ALP SERPL-CCNC: 56 UNIT/L (ref 40–150)
ALT SERPL-CCNC: 6 UNIT/L (ref 0–55)
ANION GAP SERPL CALC-SCNC: 7 MEQ/L
AST SERPL-CCNC: 11 UNIT/L (ref 5–34)
BASOPHILS # BLD AUTO: 0.05 X10(3)/MCL
BASOPHILS NFR BLD AUTO: 0.7 %
BILIRUB SERPL-MCNC: 0.2 MG/DL
BUN SERPL-MCNC: 8.1 MG/DL (ref 8.9–20.6)
CALCIUM SERPL-MCNC: 8.5 MG/DL (ref 8.4–10.2)
CHLORIDE SERPL-SCNC: 107 MMOL/L (ref 98–107)
CO2 SERPL-SCNC: 23 MMOL/L (ref 22–29)
CREAT SERPL-MCNC: 0.88 MG/DL (ref 0.73–1.18)
CREAT/UREA NIT SERPL: 9
EOSINOPHIL # BLD AUTO: 0.74 X10(3)/MCL (ref 0–0.9)
EOSINOPHIL NFR BLD AUTO: 10.7 %
ERYTHROCYTE [DISTWIDTH] IN BLOOD BY AUTOMATED COUNT: 11.4 % (ref 11.5–17)
GFR SERPLBLD CREATININE-BSD FMLA CKD-EPI: >60 ML/MIN/1.73/M2
GLOBULIN SER-MCNC: 2.6 GM/DL (ref 2.4–3.5)
GLUCOSE SERPL-MCNC: 220 MG/DL (ref 74–100)
HCT VFR BLD AUTO: 38.9 % (ref 42–52)
HGB BLD-MCNC: 13.4 G/DL (ref 14–18)
IMM GRANULOCYTES # BLD AUTO: 0.02 X10(3)/MCL (ref 0–0.04)
IMM GRANULOCYTES NFR BLD AUTO: 0.3 %
LYMPHOCYTES # BLD AUTO: 3.62 X10(3)/MCL (ref 0.6–4.6)
LYMPHOCYTES NFR BLD AUTO: 52.3 %
MAGNESIUM SERPL-MCNC: 2 MG/DL (ref 1.6–2.6)
MCH RBC QN AUTO: 32.1 PG (ref 27–31)
MCHC RBC AUTO-ENTMCNC: 34.4 G/DL (ref 33–36)
MCV RBC AUTO: 93.3 FL (ref 80–94)
MONOCYTES # BLD AUTO: 0.56 X10(3)/MCL (ref 0.1–1.3)
MONOCYTES NFR BLD AUTO: 8.1 %
NEUTROPHILS # BLD AUTO: 1.93 X10(3)/MCL (ref 2.1–9.2)
NEUTROPHILS NFR BLD AUTO: 27.9 %
NRBC BLD AUTO-RTO: 0 %
PHOSPHATE SERPL-MCNC: 3.2 MG/DL (ref 2.3–4.7)
PLATELET # BLD AUTO: 264 X10(3)/MCL (ref 130–400)
PMV BLD AUTO: 10.2 FL (ref 7.4–10.4)
POCT GLUCOSE: 101 MG/DL (ref 70–110)
POCT GLUCOSE: 176 MG/DL (ref 70–110)
POCT GLUCOSE: 93 MG/DL (ref 70–110)
POCT GLUCOSE: 95 MG/DL (ref 70–110)
POTASSIUM SERPL-SCNC: 3.6 MMOL/L (ref 3.5–5.1)
PROT SERPL-MCNC: 5.3 GM/DL (ref 6.4–8.3)
RBC # BLD AUTO: 4.17 X10(6)/MCL (ref 4.7–6.1)
SODIUM SERPL-SCNC: 137 MMOL/L (ref 136–145)
WBC # BLD AUTO: 6.92 X10(3)/MCL (ref 4.5–11.5)

## 2024-09-10 PROCEDURE — D9220A PRA ANESTHESIA: Mod: CRNA,,, | Performed by: NURSE ANESTHETIST, CERTIFIED REGISTERED

## 2024-09-10 PROCEDURE — 43239 EGD BIOPSY SINGLE/MULTIPLE: CPT | Performed by: INTERNAL MEDICINE

## 2024-09-10 PROCEDURE — 94761 N-INVAS EAR/PLS OXIMETRY MLT: CPT

## 2024-09-10 PROCEDURE — 63600175 PHARM REV CODE 636 W HCPCS

## 2024-09-10 PROCEDURE — 27201423 OPTIME MED/SURG SUP & DEVICES STERILE SUPPLY: Performed by: INTERNAL MEDICINE

## 2024-09-10 PROCEDURE — 36415 COLL VENOUS BLD VENIPUNCTURE: CPT

## 2024-09-10 PROCEDURE — 84100 ASSAY OF PHOSPHORUS: CPT

## 2024-09-10 PROCEDURE — 63600175 PHARM REV CODE 636 W HCPCS: Performed by: SPECIALIST

## 2024-09-10 PROCEDURE — 85025 COMPLETE CBC W/AUTO DIFF WBC: CPT

## 2024-09-10 PROCEDURE — 0DB38ZX EXCISION OF LOWER ESOPHAGUS, VIA NATURAL OR ARTIFICIAL OPENING ENDOSCOPIC, DIAGNOSTIC: ICD-10-PCS | Performed by: INTERNAL MEDICINE

## 2024-09-10 PROCEDURE — 83735 ASSAY OF MAGNESIUM: CPT

## 2024-09-10 PROCEDURE — 88305 TISSUE EXAM BY PATHOLOGIST: CPT | Mod: TC | Performed by: INTERNAL MEDICINE

## 2024-09-10 PROCEDURE — D9220A PRA ANESTHESIA: Mod: ANES,,, | Performed by: SPECIALIST

## 2024-09-10 PROCEDURE — 37000008 HC ANESTHESIA 1ST 15 MINUTES: Performed by: INTERNAL MEDICINE

## 2024-09-10 PROCEDURE — 63600175 PHARM REV CODE 636 W HCPCS: Performed by: NURSE ANESTHETIST, CERTIFIED REGISTERED

## 2024-09-10 PROCEDURE — 88312 SPECIAL STAINS GROUP 1: CPT | Mod: TC | Performed by: INTERNAL MEDICINE

## 2024-09-10 PROCEDURE — 0DB18ZX EXCISION OF UPPER ESOPHAGUS, VIA NATURAL OR ARTIFICIAL OPENING ENDOSCOPIC, DIAGNOSTIC: ICD-10-PCS | Performed by: INTERNAL MEDICINE

## 2024-09-10 PROCEDURE — 80053 COMPREHEN METABOLIC PANEL: CPT

## 2024-09-10 PROCEDURE — 25000003 PHARM REV CODE 250: Performed by: NURSE ANESTHETIST, CERTIFIED REGISTERED

## 2024-09-10 RX ORDER — LANCING DEVICE
1 EACH MISCELLANEOUS 2 TIMES DAILY WITH MEALS
Qty: 1 EACH | Refills: 0 | Status: SHIPPED | OUTPATIENT
Start: 2024-09-10 | End: 2025-09-10

## 2024-09-10 RX ORDER — SODIUM CHLORIDE, SODIUM LACTATE, POTASSIUM CHLORIDE, CALCIUM CHLORIDE 600; 310; 30; 20 MG/100ML; MG/100ML; MG/100ML; MG/100ML
INJECTION, SOLUTION INTRAVENOUS CONTINUOUS
Status: DISCONTINUED | OUTPATIENT
Start: 2024-09-10 | End: 2024-09-10 | Stop reason: HOSPADM

## 2024-09-10 RX ORDER — INSULIN GLARGINE 100 [IU]/ML
25 INJECTION, SOLUTION SUBCUTANEOUS 2 TIMES DAILY
Status: DISCONTINUED | OUTPATIENT
Start: 2024-09-10 | End: 2024-09-10

## 2024-09-10 RX ORDER — INSULIN PUMP SYRINGE, 3 ML
EACH MISCELLANEOUS
Qty: 1 EACH | Refills: 0 | Status: SHIPPED | OUTPATIENT
Start: 2024-09-10 | End: 2025-09-10

## 2024-09-10 RX ORDER — LIDOCAINE HYDROCHLORIDE 20 MG/ML
INJECTION INTRAVENOUS
Status: DISCONTINUED | OUTPATIENT
Start: 2024-09-10 | End: 2024-09-10

## 2024-09-10 RX ORDER — LANCETS
1 EACH MISCELLANEOUS 2 TIMES DAILY WITH MEALS
Qty: 100 EACH | Refills: 11 | Status: SHIPPED | OUTPATIENT
Start: 2024-09-10

## 2024-09-10 RX ORDER — INSULIN ASPART 100 [IU]/ML
10 INJECTION, SOLUTION INTRAVENOUS; SUBCUTANEOUS
Status: DISCONTINUED | OUTPATIENT
Start: 2024-09-10 | End: 2024-09-10 | Stop reason: HOSPADM

## 2024-09-10 RX ORDER — INSULIN GLARGINE 100 [IU]/ML
20 INJECTION, SOLUTION SUBCUTANEOUS 2 TIMES DAILY
Qty: 144 ML | Refills: 0 | Status: SHIPPED | OUTPATIENT
Start: 2024-09-10 | End: 2025-09-10

## 2024-09-10 RX ORDER — INSULIN ASPART 100 [IU]/ML
15 INJECTION, SOLUTION INTRAVENOUS; SUBCUTANEOUS
Status: DISCONTINUED | OUTPATIENT
Start: 2024-09-10 | End: 2024-09-10

## 2024-09-10 RX ORDER — INSULIN LISPRO 100 [IU]/ML
10 INJECTION, SOLUTION INTRAVENOUS; SUBCUTANEOUS
Qty: 9 ML | Refills: 11 | Status: SHIPPED | OUTPATIENT
Start: 2024-09-10 | End: 2025-09-10

## 2024-09-10 RX ORDER — INSULIN GLARGINE 100 [IU]/ML
20 INJECTION, SOLUTION SUBCUTANEOUS 2 TIMES DAILY
Status: DISCONTINUED | OUTPATIENT
Start: 2024-09-10 | End: 2024-09-10 | Stop reason: HOSPADM

## 2024-09-10 RX ORDER — FLUCONAZOLE 200 MG/1
200 TABLET ORAL NIGHTLY
Qty: 30 TABLET | Refills: 0 | Status: SHIPPED | OUTPATIENT
Start: 2024-09-10 | End: 2024-10-10

## 2024-09-10 RX ORDER — PROPOFOL 10 MG/ML
VIAL (ML) INTRAVENOUS
Status: DISCONTINUED | OUTPATIENT
Start: 2024-09-10 | End: 2024-09-10

## 2024-09-10 RX ADMIN — PROPOFOL 150 MG: 10 INJECTION, EMULSION INTRAVENOUS at 11:09

## 2024-09-10 RX ADMIN — SODIUM CHLORIDE, POTASSIUM CHLORIDE, SODIUM LACTATE AND CALCIUM CHLORIDE: 600; 310; 30; 20 INJECTION, SOLUTION INTRAVENOUS at 11:09

## 2024-09-10 RX ADMIN — PROPOFOL 50 MG: 10 INJECTION, EMULSION INTRAVENOUS at 11:09

## 2024-09-10 RX ADMIN — INSULIN ASPART 10 UNITS: 100 INJECTION, SOLUTION INTRAVENOUS; SUBCUTANEOUS at 12:09

## 2024-09-10 RX ADMIN — MUPIROCIN: 20 OINTMENT TOPICAL at 09:09

## 2024-09-10 RX ADMIN — LIDOCAINE HYDROCHLORIDE 50 MG: 20 INJECTION INTRAVENOUS at 11:09

## 2024-09-10 NOTE — PLAN OF CARE
09/09/24 1000   Discharge Assessment   Assessment Type Discharge Planning Assessment   Confirmed/corrected address, phone number and insurance Yes   Confirmed Demographics Correct on Facesheet   Source of Information patient   When was your last doctors appointment?   (No pcp)   Reason For Admission Hypokalemia, Diabetic ketoacidosis   People in Home alone   Facility Arrived From: home   Do you expect to return to your current living situation? Yes   Do you have help at home or someone to help you manage your care at home? Yes   Who are your caregiver(s) and their phone number(s)? Adiann marieSarah (Friend)  [phone]   Prior to hospitilization cognitive status: Alert/Oriented;Unable to Assess   Current cognitive status: Alert/Oriented   Walking or Climbing Stairs Difficulty no   Dressing/Bathing Difficulty no   Equipment Currently Used at Home none   Readmission within 30 days? No   Patient currently being followed by outpatient case management? No   Do you currently have service(s) that help you manage your care at home? No   Do you take prescription medications? Yes   Do you have prescription coverage? Yes   Coverage [facility] EXCHANGE PLAN -   Do you have any problems affording any of your prescribed medications? No   Is the patient taking medications as prescribed? yes   Who is going to help you get home at discharge? family   How do you get to doctors appointments? family or friend will provide   Are you on dialysis? No   Do you take coumadin? No   Discharge Plan [facility]   DME Needed Upon Discharge  none   Discharge Plan discussed with: Patient   Physical Activity   On average, how many days per week do you engage in moderate to strenuous exercise (like a brisk walk)? 0 days   On average, how many minutes do you engage in exercise at this level? 0 min   Financial Resource Strain   How hard is it for you to pay for the very basics like food, housing, medical care, and  heating? Somewhat   Housing Stability   In the last 12 months, was there a time when you were not able to pay the mortgage or rent on time? N   At any time in the past 12 months, were you homeless or living in a shelter (including now)? N   Transportation Needs   Has the lack of transportation kept you from medical appointments, meetings, work or from getting things needed for daily living? No   Food Insecurity   Within the past 12 months, you worried that your food would run out before you got the money to buy more. Never true   Within the past 12 months, the food you bought just didn't last and you didn't have money to get more. Never true   Stress   Do you feel stress - tense, restless, nervous, or anxious, or unable to sleep at night because your mind is troubled all the time - these days? Not at all   Social Isolation   How often do you feel lonely or isolated from those around you?  Never   Alcohol Use   Q1: How often do you have a drink containing alcohol? Never   Q2: How many drinks containing alcohol do you have on a typical day when you are drinking? None   Q3: How often do you have six or more drinks on one occasion? Never   Utilities   In the past 12 months has the electric, gas, oil, or water company threatened to shut off services in your home? No   Health Literacy   How often do you need to have someone help you when you read instructions, pamphlets, or other written material from your doctor or pharmacy? Never

## 2024-09-10 NOTE — TRANSFER OF CARE
Anesthesia Transfer of Care Note    Patient: Flako Mccray    Procedure(s) Performed: Procedure(s) (LRB):  EGD, WITH CLOSED BIOPSY (Left)    Patient location: GI    Anesthesia Type: general    Transport from OR: Transported from OR on room air with adequate spontaneous ventilation    Post pain: adequate analgesia    Post assessment: no apparent anesthetic complications and tolerated procedure well    Post vital signs: stable    Level of consciousness: awake    Nausea/Vomiting: no nausea/vomiting    Complications: none    Transfer of care protocol was followed

## 2024-09-10 NOTE — ANESTHESIA PREPROCEDURE EVALUATION
09/10/2024  Flako Mccray is a 44 y.o., male for EGD      PMHx of HTN, prior PCP use, bipolar 1, PTSD, anxiety, and depression who was admitted to the hospital on 9/7/24 for treatment of DKA in the setting of new-onset diabetes. GI is being consulted for dysphagia / suspected thrush       CBG 95 @ 0900       Vitals:    09/10/24 0005 09/10/24 0314 09/10/24 0725 09/10/24 0800   BP:  104/70  99/66   BP Location:       Patient Position:       Pulse:  74  74   Resp:  20  17   Temp:  36.9 °C (98.5 °F)  36.3 °C (97.4 °F)   TempSrc:  Oral  Oral   SpO2: 100% 100% 100% 100%   Weight:       Height:           Active Ambulatory Problems     Diagnosis Date Noted    Tobacco abuse 06/17/2023    Primary hypertension 10/10/2023    Encounter to establish care 10/10/2023    Anxiety and depression 10/10/2023    PTSD (post-traumatic stress disorder) 10/10/2023    Bipolar 1 disorder 10/10/2023     Resolved Ambulatory Problems     Diagnosis Date Noted    LLL pneumonia 10/10/2023     No Additional Past Medical History       History reviewed. No pertinent surgical history.    Lab Results   Component Value Date    WBC 6.92 09/10/2024    HGB 13.4 (L) 09/10/2024    HCT 38.9 (L) 09/10/2024     09/10/2024    CHOL 204 (H) 10/10/2023    TRIG 118 10/10/2023    HDL 46 10/10/2023    ALT 6 09/10/2024    AST 11 09/10/2024     09/10/2024    K 3.6 09/10/2024     09/10/2024    CREATININE 0.88 09/10/2024    BUN 8.1 (L) 09/10/2024    CO2 23 09/10/2024    TSH 0.208 (L) 09/07/2024    PSA 2.10 10/10/2023    HGBA1C >14.0 (H) 09/07/2024         Test Reason : E87.5,    Vent. Rate : 106 BPM     Atrial Rate : 106 BPM     P-R Int : 128 ms          QRS Dur : 082 ms      QT Int : 400 ms       P-R-T Axes : 083 082 056 degrees     QTc Int : 531 ms    Sinus tachycardia  Biatrial enlargement  Prolonged QT  Abnormal ECG  Confirmed by May  Jason CAMPOS ([id number]) on 9/8/2024 9:15:17 PM    Referred By: SAMMY   SELF           Confirmed By:Jason Finley MD      Specimen Collected: 09/07/24 19:24 CDT            No current facility-administered medications on file prior to encounter.     Current Outpatient Medications on File Prior to Encounter   Medication Sig Dispense Refill    fluticasone propionate (FLONASE) 50 mcg/actuation nasal spray 1 spray (50 mcg total) by Each Nostril route Daily. 16 g 1    loratadine (CLARITIN) 10 mg tablet Take 1 tablet (10 mg total) by mouth once daily. 30 tablet 1             Pre-op Assessment    I have reviewed the Patient Summary Reports.     I have reviewed the Nursing Notes. I have reviewed the NPO Status.   I have reviewed the Medications.     Review of Systems  Anesthesia Hx:  No problems with previous Anesthesia   History of prior surgery of interest to airway management or planning:          Denies Family Hx of Anesthesia complications.    Denies Personal Hx of Anesthesia complications.                    Hematology/Oncology:  Hematology Normal   Oncology Normal                                   EENT/Dental:  EENT/Dental Normal           Cardiovascular:  Cardiovascular Normal                                            Pulmonary:  Pulmonary Normal                       Renal/:  Renal/ Normal                 Hepatic/GI:  Hepatic/GI Normal                 Musculoskeletal:  Musculoskeletal Normal                Neurological:  Neurology Normal                                      Endocrine:  Endocrine Normal            Dermatological:  Skin Normal    Psych:  Psychiatric Normal                    Physical Exam  General: Well nourished, Cooperative, Alert and Oriented    Airway:  Mallampati: I / I  Mouth Opening: Normal  TM Distance: Normal  Tongue: Normal  Neck ROM: Normal ROM    Dental:  Intact        Anesthesia Plan  Type of Anesthesia, risks & benefits discussed:    Anesthesia Type: Gen Natural Airway  Intra-op Monitoring  Plan: Standard ASA Monitors  Post Op Pain Control Plan: multimodal analgesia and IV/PO Opioids PRN  Induction:  IV  Airway Plan: Direct  Informed Consent: Informed consent signed with the Patient and all parties understand the risks and agree with anesthesia plan.  All questions answered. Patient consented to blood products? No  ASA Score: 3  Day of Surgery Review of History & Physical: H&P Update referred to the surgeon/provider.    Ready For Surgery From Anesthesia Perspective.     .

## 2024-09-10 NOTE — PROVATION PATIENT INSTRUCTIONS
Discharge Summary/Instructions after an Endoscopic Procedure  Patient Name: Flako Mccray  Patient MRN: [id number]  Patient YOB: 1980  Tuesday, September 10, 2024  Tana Liu MD  Dear patient,  As a result of recent federal legislation (The Federal Cures Act), you may   receive lab or pathology results from your procedure in your [facility]   account before your physician is able to contact you. Your physician or   their representative will relay the results to you with their   recommendations at their soonest availability.  Thank you,  RESTRICTIONS:  During your procedure today, you received medications for sedation.  These   medications may affect your judgment, balance and coordination.  Therefore,   for 24 hours, you have the following restrictions:   - DO NOT drive a car, operate machinery, make legal/financial decisions,   sign important papers or drink alcohol.    ACTIVITY:  Today: no heavy lifting, straining or running due to procedural   sedation/anesthesia.  The following day: return to full activity including work.  DIET:  Eat and drink normally unless instructed otherwise.     TREATMENT FOR COMMON SIDE EFFECTS:  - Mild abdominal pain, nausea, belching, bloating or excessive gas:  rest,   eat lightly and use a heating pad.  - Sore Throat: treat with throat lozenges and/or gargle with warm salt   water.  - Because air was used during the procedure, expelling large amounts of air   from your rectum or belching is normal.  - If a bowel prep was taken, you may not have a bowel movement for 1-3 days.    This is normal.  SYMPTOMS TO WATCH FOR AND REPORT TO YOUR PHYSICIAN:  1. Abdominal pain or bloating, other than gas cramps.  2. Chest pain.  3. Back pain.  4. Signs of infection such as: chills or fever occurring within 24 hours   after the procedure.  5. Rectal bleeding, which would show as bright red, maroon, or black stools.   (A tablespoon of blood from the rectum is not serious,  especially if   hemorrhoids are present.)  6. Vomiting.  7. Weakness or dizziness.  GO DIRECTLY TO THE NEAREST EMERGENCY ROOM IF YOU HAVE ANY OF THE FOLLOWING:      Difficulty breathing              Chills and/or fever over 101 F   Persistent vomiting and/or vomiting blood   Severe abdominal pain   Severe chest pain   Black, tarry stools   Bleeding- more than one tablespoon   Any other symptom or condition that you feel may need urgent attention  Your doctor recommends these additional instructions:  If any biopsies were taken, your doctors clinic will contact you in 1 to 2   weeks with any results.  - Return patient to hospital [facility] for ongoing care.   - Advance diet as tolerated.   - Continue present medications.   - Await pathology results.  For questions, problems or results please call your physician - Tana Liu MD at Work:  [phone], Work:  [phone].  [facility] , EMERGENCY ROOM PHONE NUMBER: [phone]  IF A COMPLICATION OR EMERGENCY SITUATION ARISES AND YOU ARE UNABLE TO REACH   YOUR PHYSICIAN - GO DIRECTLY TO THE EMERGENCY ROOM.  Tana Liu MD  9/10/2024 1:24:21 PM  This report has been verified and signed electronically.  Dear patient,  As a result of recent federal legislation (The Federal Cures Act), you may   receive lab or pathology results from your procedure in your [facility]   account before your physician is able to contact you. Your physician or   their representative will relay the results to you with their   recommendations at their soonest availability.  Thank you,  PROVATION

## 2024-09-10 NOTE — PLAN OF CARE
Pt finished EGD without incident. In stable condition vitals stable. Report given to Prince TENORIO. Ready for transfer

## 2024-09-10 NOTE — DISCHARGE SUMMARY
[facility] Discharge Summary    Admitting Physician: Zach Sheehan MD  Attending Physician: Haritha Sharpe MD  Date of Admit: 9/7/2024  Date of Discharge: 9/10/2024    Condition: Stable  Outcome: Patient tolerated treatment/procedure well without complication and is now ready for discharge.  DISPOSITION: Home or Self Care          Discharge Diagnoses     Patient Active Problem List   Diagnosis    Tobacco abuse    Primary hypertension    Encounter to establish care    Anxiety and depression    PTSD (post-traumatic stress disorder)    Bipolar 1 disorder    DKA (diabetic ketoacidosis)       Principal Problem:  DKA (diabetic ketoacidosis)    Consultants and Procedures     Consultants:  IP CONSULT TO INTERNAL MEDICINE  IP CONSULT TO SOCIAL WORK/CASE MANAGEMENT  IP CONSULT TO GI    Procedures:   Procedure(s) (LRB):  EGD, WITH CLOSED BIOPSY (Left)     Brief Admission History      Flako Mccray is a 44 y.o. male with a history of HTN who presented to [facility] ED on 9/7/2024  with complaint of complaints of fatigue and dry mouth x 3 days. Patient additionally reports nausea and billious non-bloody vomiting that started earlier today. He also reports trouble swallowing with a sand paper like sensation in his mouth and throat when he eats and swallows that started in 12/2023. Since then, patient states he lost over 45 lbs unintentionally. He denies fevers, chills, HA, changes in vision, chest pain, shortness of breath, abdominal pain, dysuria, and bowel changes. Patient admits to smoking about a half a pack of cigarettes for the last 3 months which was increased from 2-3 cigarettes since April of this year. Denies EtOH and recreational drug use. Patient is not aware of any chronic diseases he was diagnosed with previously and does not take any daily prescription medications. Of note, patient was placed on bactrim for hang nail that started to bother him about a week and a half ago. He only took 3 days of  "bactrim BID but did not take dose today.     Hospital Course with Pertinent Findings     Patient was admitted for DKA protocol and started on insulin drip.  Patient's anion gap closed with appropriate response to treatment.  Patient's DKA improved and patient was able to be downgraded to the floor.  Patient was started on basal and prandial insulin with good control of his glucose.  Of note, patient did report having significant weight loss since January of this year along with issues with swallowing.  Due to abundance of concern, GI was consulted for EGD to evaluate for significant weight loss.  Patient was found significant white plaque with suspected candidiasis EGD performed.  Patient tolerated procedure well and is feeling much improved compared to presentation.  Patient will need to complete treatment for esophageal candidiasis with fluconazole for 21 days of treatment.  Otherwise, patient medically stable at this time and ready for discharge.  Patient will need close follow up with PCP.  Patient will also have follow up with [facility] Diabetes education to receive further education about diet and glucose control.    Discharge physical exam:  Vitals  BP: 120/86  Temp: 98 °F (36.7 °C)  Temp Source: Oral  Pulse: 83  Resp: 19  SpO2: 100 %  Height: 5' 11.5" (181.6 cm)  Weight: 68.8 kg (151 lb 9.6 oz)    Physical Exam  Constitutional:       Appearance: Normal appearance.   HENT:      Head: Normocephalic and atraumatic.      Nose: Nose normal.      Mouth/Throat:      Mouth: Mucous membranes are moist.      Comments: White plaque present  Eyes:      Conjunctiva/sclera: Conjunctivae normal.      Pupils: Pupils are equal, round, and reactive to light.   Cardiovascular:      Rate and Rhythm: Normal rate and regular rhythm.      Pulses: Normal pulses.      Heart sounds: No murmur heard.  Pulmonary:      Effort: Pulmonary effort is normal. No respiratory distress.      Breath sounds: No wheezing.   Chest:      Chest wall: No " tenderness.   Abdominal:      General: Abdomen is flat. Bowel sounds are normal. There is no distension.      Palpations: Abdomen is soft.      Tenderness: There is no abdominal tenderness.   Musculoskeletal:         General: No swelling. Normal range of motion.      Cervical back: Normal range of motion.   Skin:     General: Skin is warm.   Neurological:      General: No focal deficit present.      Mental Status: He is alert and oriented to person, place, and time.          TIME SPENT ON DISCHARGE: 60 minutes    Discharge Medications        Medication List        START taking these medications      blood sugar diagnostic Strp  1 strip by Misc.(Non-Drug; Combo Route) route 2 (two) times daily with meals.     blood-glucose meter kit  Use as instructed     fluconazole 200 MG Tab  Commonly known as: DIFLUCAN  Take 1 tablet (200 mg total) by mouth every evening.     insulin lispro 100 unit/mL pen  Commonly known as: HumaLOG KwikPen Insulin  Inject 10 Units into the skin 3 (three) times daily with meals.     lancets Misc  1 lancet  by Misc.(Non-Drug; Combo Route) route 2 (two) times daily with meals.     lancing device Misc  1 Device by Misc.(Non-Drug; Combo Route) route 2 (two) times daily with meals.     LANTUS SOLOSTAR U-100 INSULIN 100 unit/mL (3 mL) Inpn pen  Generic drug: insulin glargine U-100 (Lantus)  Inject 20 Units into the skin 2 (two) times a day.            CONTINUE taking these medications      fluticasone propionate 50 mcg/actuation nasal spray  Commonly known as: FLONASE  1 spray (50 mcg total) by Each Nostril route Daily.     loratadine 10 mg tablet  Commonly known as: CLARITIN  Take 1 tablet (10 mg total) by mouth once daily.               Where to Get Your Medications        These medications were sent to [place], [place] - [address].  [address] [address]      Phone: [phone]   blood sugar diagnostic Strp  blood-glucose meter  kit  fluconazole 200 MG Tab  insulin lispro 100 unit/mL pen  lancets Misc  lancing device Misc  LANTUS SOLOSTAR U-100 INSULIN 100 unit/mL (3 mL) Inpn pen         Discharge Information:      Follow-up Information       Arlyn Zendejas FNP. Schedule an appointment as soon as possible for a visit in 1 week(s).    Specialty: Family Medicine  Contact information:  [address] [facility] [address]  [phone]               [facility] - Emergency Dept Follow up.    Specialty: Emergency Medicine  Why: As needed, If symptoms worsen  Contact information:  [address] [facility] [id number] [phone]                           Stevie Paris MD  [facility] Internal Medicine, [facility]

## 2024-09-10 NOTE — ANESTHESIA POSTPROCEDURE EVALUATION
Anesthesia Post Evaluation    Patient: Flako Mccray    Procedure(s) Performed: Procedure(s) (LRB):  EGD, WITH CLOSED BIOPSY (Left)    Final Anesthesia Type: general      Patient location during evaluation: GI PACU  Patient participation: Yes- Able to Participate  Level of consciousness: awake and alert  Post-procedure vital signs: reviewed and stable  Pain management: adequate  Airway patency: patent    PONV status at discharge: No PONV  Anesthetic complications: no      Cardiovascular status: stable  Respiratory status: spontaneous ventilation and unassisted  Hydration status: euvolemic  Follow-up not needed.

## 2024-09-10 NOTE — PROGRESS NOTES
Gastroenterology/Hepatology Progress Note    Patient Name: Flako Mccray  Age: 44 y.o.  : 1980  MRN: [id number]  Admission Date: 2024    SUBJECTIVE:     NAEON. Patient reports some improvement in mouth/throat pain with initiation of Fluconazole. Complaining of generalized weakness this AM, RN aware and monitoring. POCT glucose 95 which is significantly lower than what he generally lives around. Denies nausea, vomiting, abdominal pain, diarrhea and constipation. NPO since midnight in preparation of EGD today. Tentative discharge today following procedure.     Review of patient's allergies indicates:  No Known Allergies       INPATIENT MEDICATIONS    Scheduled Meds:   enoxparin  40 mg Subcutaneous Daily    fluconazole  200 mg Oral QHS    insulin aspart U-100  15 Units Subcutaneous TIDWM    insulin glargine U-100  25 Units Subcutaneous BID    mupirocin   Nasal BID     Continuous Infusions:  PRN Meds:    Current Facility-Administered Medications:     dextrose 10%, 12.5 g, Intravenous, PRN    dextrose 10%, 25 g, Intravenous, PRN    glucagon (human recombinant), 1 mg, Intramuscular, PRN    glucose, 16 g, Oral, PRN    glucose, 24 g, Oral, PRN    insulin aspart U-100, 0-10 Units, Subcutaneous, QID (AC + HS) PRN    labetalol, 10 mg, Intravenous, Q4H PRN    naloxone, 0.02 mg, Intravenous, PRN    ondansetron, 8 mg, Intravenous, Q8H PRN    sodium chloride 0.9%, 10 mL, Intravenous, PRN          Review of Systems:     Review of Systems   Constitutional:  Positive for fatigue and unexpected weight change. Negative for appetite change, chills and fever.   HENT:  Positive for sore throat and trouble swallowing. Negative for mouth dryness and voice change.    Eyes:  Negative for visual disturbance.   Respiratory:  Negative for cough, choking, chest tightness and shortness of breath.    Cardiovascular:  Negative for chest pain, palpitations and claudication.   Gastrointestinal:  Negative for abdominal pain,  constipation, diarrhea, nausea, vomiting and reflux.   Endocrine: Negative for polydipsia and polyuria.   Genitourinary:  Negative for dysuria and hematuria.   Musculoskeletal:  Negative for arthralgias, back pain and myalgias.   Neurological:  Positive for weakness. Negative for dizziness, light-headedness and coordination difficulties.   Psychiatric/Behavioral:  Negative for confusion.           Objective:       VITAL SIGNS: 24 HR MIN & MAX LAST    Temp  Min: 98 °F (36.7 °C)  Max: 98.7 °F (37.1 °C)  98.5 °F (36.9 °C)        BP  Min: 104/70  Max: 119/71  104/70     Pulse  Min: 74  Max: 94  74     Resp  Min: 17  Max: 20  20    SpO2  Min: 99 %  Max: 100 %  100 %        Physical Exam  Vitals and nursing note reviewed.   Constitutional:       General: He is not in acute distress.     Appearance: Normal appearance. He is normal weight. He is not ill-appearing.   HENT:      Head: Normocephalic and atraumatic.      Mouth/Throat:      Mouth: Mucous membranes are moist.      Pharynx: Oropharynx is clear. Uvula midline.      Tonsils: No tonsillar exudate.      Comments: Minimal thrush present  Eyes:      Extraocular Movements: Extraocular movements intact.      Conjunctiva/sclera: Conjunctivae normal.      Pupils: Pupils are equal, round, and reactive to light.   Cardiovascular:      Rate and Rhythm: Normal rate and regular rhythm.      Pulses: Normal pulses.   Pulmonary:      Effort: Pulmonary effort is normal. No respiratory distress.      Breath sounds: Normal breath sounds.   Abdominal:      General: Abdomen is flat. Bowel sounds are normal. There is no distension.      Palpations: Abdomen is soft.   Musculoskeletal:         General: Normal range of motion.      Cervical back: Normal range of motion and neck supple. No tenderness.   Skin:     General: Skin is warm and dry.      Coloration: Skin is not pale.      Findings: No rash.   Neurological:      General: No focal deficit present.      Mental Status: He is alert and  "oriented to person, place, and time.   Psychiatric:         Mood and Affect: Mood normal.         Behavior: Behavior normal.         Thought Content: Thought content normal.         Judgment: Judgment normal.         LABS:    Recent Labs   Lab 09/07/24  1646 09/08/24  0519 09/09/24  0425 09/10/24  0416   WBC 13.95* 13.06* 8.67 6.92   HGB 17.6 16.4 15.2 13.4*   HCT 52.8* 45.8 42.8 38.9*    343 296 264   MCV 95.0* 91.1 91.5 93.3       Recent Labs   Lab 09/07/24  1646 09/08/24 0519 09/09/24  0425 09/10/24  0416   HGB 17.6 16.4 15.2 13.4*   HCT 52.8* 45.8 42.8 38.9*        Recent Labs   Lab 09/08/24 0519 09/08/24  0809 09/09/24 0028 09/09/24  0425 09/10/24  0415    135* 130* 133* 137   K 3.6 3.7 3.7 3.4* 3.6   CO2 18* 20* 20* 21* 23   BUN 13.2 12.5 11.1 10.2 8.1*   CREATININE 1.26* 1.29* 1.06 0.88 0.88   BILITOT  --   --   --  0.4 0.2   ALKPHOS  --   --   --  69 56   AST  --   --   --  10 11   ALT  --   --   --  5 6   LABPROT  --   --   --  5.9* 5.3*   ALBUMIN  --   --   --  3.0* 2.7*        No results for input(s): "INR", "PROTIME", "PTT" in the last 168 hours.     No results for input(s): "IRON", "FERRITIN" in the last 168 hours.         IMAGING:   US Retroperitoneal Limited    Result Date: 9/9/2024  EXAMINATION: US RETROPERITONEAL LIMITED CLINICAL HISTORY: Rule out hydronephrosis and ureteral obstruction; Type 2 diabetes mellitus with ketoacidosis without coma COMPARISON: No priors FINDINGS: Grayscale and color Doppler sonographic evaluation of the kidneys. The right kidney measures 9 cm. The left kidney measures 11 cm.   No hydronephrosis.  Grossly normal renal parenchymal echogenicity.     No hydronephrosis. Electronically signed by: Addison Camargo Date:    09/09/2024 Time:    14:25    X-Ray Chest AP Portable    Result Date: 9/7/2024  EXAMINATION: XR CHEST AP PORTABLE CLINICAL HISTORY: Type 2 diabetes mellitus with ketoacidosis without coma TECHNIQUE: One view COMPARISON: October 10, 2023. FINDINGS: " Cardiopericardial silhouette is within normal limits. Lungs are without dense focal or segmental consolidation, congestive process, pleural effusions or pneumothorax.     NO ACUTE CARDIOPULMONARY PROCESS IDENTIFIED. Electronically signed by: Andrzej Conway Date:    09/07/2024 Time:    19:47        Assessment / Plan:     Esophageal candidiasis vs eosinophilic esophagitis   - Patient with 6 month history of odynophagia, dysphagia, and unintentional weight loss. Minimal thrush present on examination; however, patient has recent images showing diffuse, white plaques covering the tongue and inside cheeks.   - EGD today revealed patchy, white plaques and longitudinal furrows diffusely throughout esophagus. Findings suggestive of esophageal candidiasis vs eosinophilic esophagitis.  - Follow-up results of biopsies   - Continue Fluconazole 200 mg PO for a total treatment duration of 21 days      Divine Hancock MD  Gastroenterology and Hepatology  [facility], PGY-1

## 2024-09-11 ENCOUNTER — PATIENT OUTREACH (OUTPATIENT)
Dept: ADMINISTRATIVE | Facility: CLINIC | Age: 44
End: 2024-09-11
Payer: COMMERCIAL

## 2024-09-11 ENCOUNTER — NURSE TRIAGE (OUTPATIENT)
Dept: ADMINISTRATIVE | Facility: CLINIC | Age: 44
End: 2024-09-11
Payer: COMMERCIAL

## 2024-09-11 LAB — GAD65 AB SER-SCNC: 0 NMOL/L

## 2024-09-11 NOTE — TELEPHONE ENCOUNTER
Pt reports he has newly been diagnosed with diabetes. Reports blood glucose was 255 so he took his insulin and then ate. Reports blood glucose is now 173. Advised, per protocol. Verbalizes understanding.     Reason for Disposition   Blood glucose 70 - 240 mg/dL (3.9 -13.3 mmol/L)    Additional Information   Negative: Unconscious or difficult to awaken   Negative: Acting confused (e.g., disoriented, slurred speech)   Negative: Very weak (can't stand)   Negative: Sounds like a life-threatening emergency to the triager   Negative: Blood glucose > 240 mg/dL (13.3 mmol/L) AND vomiting AND unable to check for ketones (in blood or urine)   Negative: Blood glucose > 240 mg/dL (13.3 mmol/L) and blood ketones > 1.4 mmol/L   Negative: Blood glucose > 500 mg/dL (27.8 mmol/L)   Negative: Blood glucose > 240 mg/dL (13.3 mmol/L) AND urine ketones moderate-large (or more than 1+)   Negative: Vomiting and signs of dehydration (e.g., very dry mouth, lightheaded, dark urine)   Negative: Blood glucose > 240 mg/dL (13.3 mmol/L) and rapid breathing   Negative: Vomiting lasting > 4 hours   Negative: Patient sounds very sick or weak to the triager   Negative: Fever > 100.4 F (38.0 C)   Negative: Caller has URGENT medication or insulin pump question and triager unable to answer question   Negative: Blood glucose > 400 mg/dL (22.2 mmol/L)   Negative: Blood glucose > 300 mg/dL (16.7 mmol/L) AND two or more times in a row   Negative: Urine ketones moderate - large (or blood ketones > 1.4 mmol/L)   Negative: Symptoms of high blood sugar (e.g., abnormally thirsty, frequent urination, weight loss) and not able to test blood glucose, AND pregnant   Negative: Blood glucose > 240 mg/dL (13.3 mmol/L) AND pregnant   Negative: Symptoms of high blood sugar (e.g., abnormally thirsty, frequent urination, weight loss) and not able to test blood glucose   Negative: New-onset diabetes mellitus suspected (e.g., frequent urination, weak, weight loss)    Negative: Patient wants to be seen   Negative: Caller has NON-URGENT medication question about med that PCP prescribed and triager unable to answer question    Protocols used: Diabetes - High Blood Sugar-A-OH

## 2024-09-11 NOTE — PROGRESS NOTES
C3 nurse spoke with Flako Mccray  for a TCC post hospital discharge follow up call. The patient reports does not have a scheduled HOSFU appointment. C3 nurse was [facility]. Patient advised to contact their PCP to schedule a HOSPFU within 5-7 days.

## 2024-09-16 LAB
ESTROGEN SERPL-MCNC: NORMAL PG/ML
INSULIN SERPL-ACNC: NORMAL U[IU]/ML
LAB AP CLINICAL INFORMATION: NORMAL
LAB AP GROSS DESCRIPTION: NORMAL
LAB AP REPORT FOOTNOTES: NORMAL
T3RU NFR SERPL: NORMAL %

## 2024-09-25 ENCOUNTER — CLINICAL SUPPORT (OUTPATIENT)
Dept: DIABETES | Facility: CLINIC | Age: 44
End: 2024-09-25
Payer: COMMERCIAL

## 2024-09-25 VITALS — WEIGHT: 179.19 LBS | BODY MASS INDEX: 24.64 KG/M2

## 2024-09-25 DIAGNOSIS — E11.9 DIABETES MELLITUS, NEW ONSET: Primary | ICD-10-CM

## 2024-09-25 DIAGNOSIS — E11.10 DIABETIC KETOACIDOSIS: ICD-10-CM

## 2024-09-25 PROCEDURE — 99212 OFFICE O/P EST SF 10 MIN: CPT | Mod: PBBFAC | Performed by: DIETITIAN, REGISTERED

## 2024-09-25 PROCEDURE — G0108 DIAB MANAGE TRN  PER INDIV: HCPCS | Mod: PBBFAC | Performed by: DIETITIAN, REGISTERED

## 2024-09-25 NOTE — PROGRESS NOTES
Diabetes Care Specialist Progress Note  Author: Carina Ross RD  Date: 9/25/2024    Intake    Program Intake  Reason for Diabetes Program Visit:: Initial Diabetes Assessment  Current diabetes risk level:: low  In the last 12 months, have you:: been admitted to a hospital  Was the ER or hospital admission related to diabetes?: Yes    Current Diabetes Treatment: Insulin, Diet/Exercise  Diet/Exercise Type/Dose: reducing rice and avoiding sugary beverages  Method of insulin delivery?: Injections  Injection Type: Pens  Pen Type/Dose: Lantus 20 untis BID and Humalog 10 units with meals    Continuous Glucose Monitoring  Patient has CGM: No    Lab Results   Component Value Date    HGBA1C >14.0 (H) 09/07/2024       Weight: 81.3 kg (179 lb 3.2 oz)       Body mass index is 24.64 kg/m².    Lifestyle Coping Support & Clinical    Lifestyle/Coping/Support  Does anyone in your family have diabetes or does anyone in your family support you in your diabetes care?: girlfriend  Psychosocial/Coping Skills Assessment Completed: : Yes  Assessment indicates:: Adequate understanding  Area of need?: No         Diabetes Self-Management Skills Assessment    Medication Skills Assessment  Patient is able to identify current diabetes medications, dosages, and appropriate timing of medications.: yes  Patient reports problems or concerns with current medication regimen.: yes  Medication regimen problems/concerns:: concerned about side effects  Patient is  aware that some diabetes medications can cause low blood sugar?: Yes  Medication Skills Assessment Completed:: Yes  Assessment indicates:: Instruction Needed  Area of need?: Yes    Diabetes Disease Process/Treatment Options  When were you diagnosed?: 2 weeks ago  If previous diabetes education, when/where:: no  What are your goals for this education session?: wants to talk about CGM  Is patient aware of what causes diabetes?: Yes  Does patient understand the pathophysiology of diabetes?:  Yes  Diabetes Disease Process/Treatment Options: Skills Assessment Completed: Yes  Assessment indicates:: Adequate understanding  Area of need?: No    Nutrition/Healthy Eating  Nutrition/Healthy Eating Skills Assessment Completed:: No  Deffered due to:: Time  Area of need?: Deferred    Physical Activity/Exercise  Patient's daily activity level:: constantly moving  Patient can identify forms of physical activity.: yes  Physical Activity/Exercise Skills Assessment Completed: : Yes  Assessment indicates:: Adequate understanding  Area of need?: No    Home Blood Glucose Monitoring  Patient states that blood sugar is checked at home daily.: yes  Monitoring Method:: home glucometer  Fasting BG range history:: 90-120s  Postmeal BG range history:: 200-300  Home Blood Glucose Monitoring Skills Assessment Completed: : Yes  Assessment indicates:: Instruction Needed  Area of need?: Yes    Acute Complications  Have you ever had hypoglycemia (low BG 70 or less)?: yes  How often and what are your symptoms?: shaky, weak and blurry vision  How do you treat hypoglycemia?: handful of skittles  Have you ever had hyperglycemia (high  or more)?: yes  How often and what are your symptoms?: blurry vision, thirst,  How do you treat hyperglycemia? : drink water  Have you ever had DKA?: yes  Acute Complications Skills Assessment Completed: : Yes  Assessment indicates:: Instruction Needed  Area of need?: Yes    Chronic Complications  Do you see an eye doctor?: yes  Eye doctor date of last visit:: going next week  Chronic Complications Skills Assessment Completed: : No  Deferred due to:: Time  Area of need?: Deferred      Assessment Summary and Plan    Based on today's diabetes care assessment, the following areas of need were identified:          9/25/2024    12:01 AM   Areas of Need   Medications/Current Diabetes Treatment Yes - see care plan   Lifestyle Coping Support No   Diabetes Disease Process/Treatment Options No    Nutrition/Healthy Eating Deferred   Physical Activity/Exercise No   Home Blood Glucose Monitoring Yes - discussed questions about CGM. Reviewed Freestyle Leeann 3 and Dexcom G7, both for arm wear. Instructed that he should contact his insurance for coverage information. If he has Medicaid, it should be covered by insurance due to being on insulin. Instructed to download miriam on his phone. Provided information for both options.   Acute Complications Yes - Reviewed blood glucose goals, prevention, detection, signs and symptoms, and treatment of hypoglycemia and hyperglycemia, and when to contact the clinic.   Chronic Complications Deferred       Today's interventions were provided through individual discussion, instruction, and written materials were provided.      Patient verbalized understanding of instruction and written materials.  Pt was able to return back demonstration of instructions today. Patient understood key points, needs reinforcement and further instruction.     Diabetes Self-Management Care Plan:    Today's Diabetes Self-Management Care Plan was developed with Flako's input. Flako has agreed to work toward the following goal(s) to improve his/her overall diabetes control.      Care Plan: Diabetes Management   Updates made since 8/26/2024 12:00 AM        Problem: Medications         Goal: Patient Agrees to take Diabetes Medication(s) as prescribed: Humalog before meals and Lantus BID 6am and 6pm    Start Date: 9/25/2024   Expected End Date: 10/30/2024   Priority: High   Barriers: No Barriers Identified        Task: Reviewed with patient all current diabetes medications and provided basic review of the purpose, dosage, frequency, side effects, and storage of both oral and injectable diabetes medications. Completed 9/25/2024        Task: Instructed patient on how to self-administer insulin via pens Completed 9/25/2024        Task: Discussed guidelines for preventing, detecting and treating hypoglycemia  and hyperglycemia and reviewed the importance of meal and medication timing with diabetes mediations for prevention of hypoglycemia and maximum drug benefit. Completed 9/25/2024          Follow Up Plan     Follow up if symptoms worsen or fail to improve. No benefits for this visit.    Today's care plan and follow up schedule was discussed with patient.  Flako verbalized understanding of the care plan, goals, and agrees to follow up plan.        The patient was encouraged to communicate with his/her health care provider/physician and care team regarding his/her condition(s) and treatment.  I provided the patient with my contact information today and encouraged to contact me via phone or [facility] Portal as needed.     Length of Visit   Total Time: 30 Minutes

## 2024-12-21 ENCOUNTER — HOSPITAL ENCOUNTER (EMERGENCY)
Facility: HOSPITAL | Age: 44
Discharge: HOME | End: 2024-12-21
Attending: INTERNAL MEDICINE
Payer: MEDICAID

## 2024-12-21 VITALS
HEART RATE: 78 BPM | HEIGHT: 71 IN | DIASTOLIC BLOOD PRESSURE: 80 MMHG | SYSTOLIC BLOOD PRESSURE: 136 MMHG | TEMPERATURE: 97 F | RESPIRATION RATE: 20 BRPM | OXYGEN SATURATION: 100 % | BODY MASS INDEX: 27.16 KG/M2 | WEIGHT: 194 LBS

## 2024-12-21 DIAGNOSIS — J06.9 UPPER RESPIRATORY TRACT INFECTION, UNSPECIFIED TYPE: Primary | ICD-10-CM

## 2024-12-21 PROCEDURE — 87651 STREP A DNA AMP PROBE: CPT | Performed by: PHYSICIAN ASSISTANT

## 2024-12-21 PROCEDURE — 0241U COVID/RSV/FLU A&B PCR: CPT | Performed by: PHYSICIAN ASSISTANT

## 2024-12-21 PROCEDURE — 99282 EMERGENCY DEPT VISIT SF MDM: CPT

## 2024-12-21 RX ORDER — DEXTROMETHORPHAN POLISTIREX 30 MG/5ML
60 SUSPENSION ORAL 2 TIMES DAILY PRN
Qty: 89 ML | Refills: 0 | Status: SHIPPED | OUTPATIENT
Start: 2024-12-21

## 2024-12-21 NOTE — ED PROVIDER NOTES
Encounter Date: 12/21/2024     History     Chief Complaint   Patient presents with    Cough    Sore Throat    Headache     C/o above symptoms x 2 days.     Patient with pmhx of insulin dependent diabetes mellitus presents today c/o cough productive of green sputum, subj fever, sore throat, and headache since yesterday. Patient denies any exacerbating or relieving factors. He denies any other associated symptoms.     The history is provided by the patient. No  was used.     Review of patient's allergies indicates:  No Known Allergies  History reviewed. No pertinent past medical history.  Past Surgical History:   Procedure Laterality Date    EGD, WITH CLOSED BIOPSY Left 9/10/2024    Procedure: EGD, WITH CLOSED BIOPSY;  Surgeon: Tana Liu MD;  Location: [facility] ENDOSCOPY;  Service: Gastroenterology;  Laterality: Left;     Family History   Problem Relation Name Age of Onset    Diabetes Mother      Hypertension Father      Diabetes Father      Aneurysm Sister      Diabetes Maternal Uncle      Cancer Paternal Aunt      Cancer Paternal Uncle      Aneurysm Paternal Grandmother      Cancer Paternal Grandfather          prostate    Stroke Paternal Grandfather      Cancer Paternal Cousin       Social History     Tobacco Use    Smoking status: Every Day     Current packs/day: 1.00     Average packs/day: 1 pack/day for 12.0 years (12.0 ttl pk-yrs)     Types: Cigarettes     Start date: 2013    Smokeless tobacco: Never   Substance Use Topics    Alcohol use: Yes     Comment: liquor 2 x a week    Drug use: Not Currently     Types: Marijuana     Comment: daily     Review of Systems   Constitutional:  Negative for chills.        Subj fever    HENT:  Positive for sore throat. Negative for congestion, postnasal drip, rhinorrhea, sinus pressure and sinus pain.    Respiratory:  Positive for cough. Negative for shortness of breath, wheezing and stridor.    Cardiovascular:  Negative for chest pain.    Gastrointestinal:  Negative for abdominal pain, constipation, diarrhea, nausea and vomiting.   Genitourinary:  Negative for dysuria, flank pain and hematuria.   Musculoskeletal:  Negative for arthralgias and myalgias.   Skin:  Negative for rash.   Neurological:  Positive for headaches.   All other systems reviewed and are negative.      Physical Exam     Initial Vitals [12/21/24 1259]   BP Pulse Resp Temp SpO2   138/83 74 20 97.2 °F (36.2 °C) 100 %      MAP       --         Physical Exam    Vitals reviewed.  Constitutional: He is not diaphoretic. No distress.   HENT:   Head: Normocephalic and atraumatic. Mouth/Throat: Oropharynx is clear and moist. No oropharyngeal exudate.   Mild pharyngeal erythema    Eyes: Conjunctivae and EOM are normal.   Neck: Neck supple.   Cardiovascular:  Normal rate, regular rhythm, normal heart sounds and intact distal pulses.           Pulmonary/Chest: Breath sounds normal. No respiratory distress. He has no wheezes. He has no rhonchi. He has no rales.   Abdominal: Abdomen is soft. He exhibits no distension. There is no abdominal tenderness.   Musculoskeletal:         General: No edema.      Cervical back: Neck supple.     Neurological: He is alert and oriented to person, place, and time. GCS score is 15. GCS eye subscore is 4. GCS verbal subscore is 5. GCS motor subscore is 6.   Skin: Skin is warm and dry. Capillary refill takes less than 2 seconds. No rash noted.   Psychiatric: He has a normal mood and affect.         ED Course   Procedures  Labs Reviewed   COVID/RSV/FLU A&B PCR - Normal       Result Value    Influenza A PCR Not Detected      Influenza B PCR Not Detected      Respiratory Syncytial Virus PCR Not Detected      SARS-CoV-2 PCR Not Detected      Narrative:     The Xpert Xpress SARS-CoV-2/FLU/RSV plus is a rapid, multiplexed real-time PCR test intended for the simultaneous qualitative detection and differentiation of SARS-CoV-2, Influenza A, Influenza B, and respiratory  syncytial virus (RSV) viral RNA in either nasopharyngeal swab or nasal swab specimens.         STREP GROUP A BY PCR - Normal    STREP A PCR ([facility]) Not Detected      Narrative:     The Xpert Xpress Strep A test is a rapid, qualitative in vitro diagnostic test for the detection of Streptococcus pyogenes (Group A ß-hemolytic Streptococcus, Strep A) in throat swab specimens from patients with signs and symptoms of pharyngitis.            Imaging Results    None          Medications - No data to display  Medical Decision Making  Ddx includes but is not limited to covid-19, influenza, common cold, strep throat, amongst others     Amount and/or Complexity of Data Reviewed  External Data Reviewed: notes.  Labs:  Decision-making details documented in ED Course.    Risk  OTC drugs.  Risk Details: Given strict ED return precautions. I have spoken with the patient and/or caregivers. I have explained the patient's condition, diagnoses and treatment plan based on the information available to me at this time. I have answered the patient's and/or caregiver's questions and addressed any concerns. The patient and/or caregivers have as good an understanding of the patient's diagnosis, condition and treatment plan as can be expected at this point. The vital signs have been stable. The patient's condition is stable and appropriate for discharge from the emergency department.      The patient will pursue further outpatient evaluation with the primary care physician or other designated or consulting physician as outlined in the discharge instructions. The patient and/or caregivers are agreeable to this plan of care and follow-up instructions have been explained in detail. The patient and/or caregivers have received these instructions in written format and have expressed an understanding of the discharge instructions. The patient and/or caregivers are aware that any significant change in condition or worsening of symptoms should prompt an  immediate return to this or the closest emergency department or a call to 911.               ED Course as of 12/21/24 1533   Sat Dec 21, 2024   1532 Influenza A, Molecular: Not Detected [SA]   1532 Influenza B, Molecular: Not Detected [SA]   1532 RSV Ag by Molecular Method: Not Detected [SA]   1532 SARS-CoV2 (COVID-19) Qualitative PCR: Not Detected [SA]   1532 STREP A PCR ([facility]): Not Detected [SA]      ED Course User Index  [SA] Meeta Sheehan PA                           Clinical Impression:  Final diagnoses:  [J06.9] Upper respiratory tract infection, unspecified type (Primary)          ED Disposition Condition    Discharge Stable          ED Prescriptions       Medication Sig Dispense Start Date End Date Auth. Provider    dextromethorphan (DELSYM 12 HOUR) 30 mg/5 mL liquid Take 10 mLs (60 mg total) by mouth 2 (two) times daily as needed for Cough. 89 mL 12/21/2024 -- Meeta Sheehan PA          Follow-up Information       Follow up With Specialties Details Why Contact Info    [facility] - Emergency Dept Emergency Medicine  If symptoms worsen return to ED immediately [address] [facility] [address] [phone]    Arlyn Zendejas FNP Family Medicine In 1 day  [address] [facility] [id number] [phone]               Meeta Sheehan PA  12/21/24 1533

## 2025-01-01 ENCOUNTER — HOSPITAL ENCOUNTER (EMERGENCY)
Facility: HOSPITAL | Age: 45
Discharge: HOME | End: 2025-01-01
Attending: INTERNAL MEDICINE
Payer: COMMERCIAL

## 2025-01-01 VITALS
TEMPERATURE: 98 F | HEIGHT: 71 IN | HEART RATE: 70 BPM | DIASTOLIC BLOOD PRESSURE: 91 MMHG | RESPIRATION RATE: 18 BRPM | BODY MASS INDEX: 27.24 KG/M2 | WEIGHT: 194.56 LBS | OXYGEN SATURATION: 98 % | SYSTOLIC BLOOD PRESSURE: 137 MMHG

## 2025-01-01 DIAGNOSIS — R59.0 LAD (LYMPHADENOPATHY), INGUINAL: ICD-10-CM

## 2025-01-01 DIAGNOSIS — E87.6 HYPOKALEMIA: Primary | ICD-10-CM

## 2025-01-01 DIAGNOSIS — R20.2 PARESTHESIA OF LEFT UPPER EXTREMITY: ICD-10-CM

## 2025-01-01 LAB
ALBUMIN SERPL-MCNC: 3.5 G/DL (ref 3.5–5)
ALBUMIN/GLOB SERPL: 1 RATIO (ref 1.1–2)
ALP SERPL-CCNC: 64 UNIT/L (ref 40–150)
ALT SERPL-CCNC: 26 UNIT/L (ref 0–55)
ANION GAP SERPL CALC-SCNC: 7 MEQ/L
AST SERPL-CCNC: 34 UNIT/L (ref 5–34)
BACTERIA #/AREA URNS AUTO: NORMAL /HPF
BASOPHILS # BLD AUTO: 0.04 X10(3)/MCL
BASOPHILS NFR BLD AUTO: 0.4 %
BILIRUB SERPL-MCNC: 0.2 MG/DL
BILIRUB UR QL STRIP.AUTO: NEGATIVE
BUN SERPL-MCNC: 16.9 MG/DL (ref 8.9–20.6)
CALCIUM SERPL-MCNC: 9.2 MG/DL (ref 8.4–10.2)
CHLORIDE SERPL-SCNC: 109 MMOL/L (ref 98–107)
CLARITY UR: CLEAR
CO2 SERPL-SCNC: 25 MMOL/L (ref 22–29)
COLOR UR AUTO: NORMAL
CREAT SERPL-MCNC: 1.03 MG/DL (ref 0.72–1.25)
CREAT/UREA NIT SERPL: 16
CRP SERPL-MCNC: 33.5 MG/L
EOSINOPHIL # BLD AUTO: 0.2 X10(3)/MCL (ref 0–0.9)
EOSINOPHIL NFR BLD AUTO: 2.1 %
ERYTHROCYTE [DISTWIDTH] IN BLOOD BY AUTOMATED COUNT: 12 % (ref 11.5–17)
GFR SERPLBLD CREATININE-BSD FMLA CKD-EPI: >60 ML/MIN/1.73/M2
GLOBULIN SER-MCNC: 3.5 GM/DL (ref 2.4–3.5)
GLUCOSE SERPL-MCNC: 71 MG/DL (ref 74–100)
GLUCOSE UR QL STRIP: NORMAL
HCT VFR BLD AUTO: 44.1 % (ref 42–52)
HGB BLD-MCNC: 14.8 G/DL (ref 14–18)
HGB UR QL STRIP: NEGATIVE
HOLD SPECIMEN: NORMAL
HOLD SPECIMEN: NORMAL
HYALINE CASTS #/AREA URNS LPF: NORMAL /LPF
IMM GRANULOCYTES # BLD AUTO: 0.03 X10(3)/MCL (ref 0–0.04)
IMM GRANULOCYTES NFR BLD AUTO: 0.3 %
KETONES UR QL STRIP: NEGATIVE
LEUKOCYTE ESTERASE UR QL STRIP: NEGATIVE
LYMPHOCYTES # BLD AUTO: 2.68 X10(3)/MCL (ref 0.6–4.6)
LYMPHOCYTES NFR BLD AUTO: 27.8 %
MAGNESIUM SERPL-MCNC: 2 MG/DL (ref 1.6–2.6)
MCH RBC QN AUTO: 30.7 PG (ref 27–31)
MCHC RBC AUTO-ENTMCNC: 33.6 G/DL (ref 33–36)
MCV RBC AUTO: 91.5 FL (ref 80–94)
MONOCYTES # BLD AUTO: 1.07 X10(3)/MCL (ref 0.1–1.3)
MONOCYTES NFR BLD AUTO: 11.1 %
NEUTROPHILS # BLD AUTO: 5.63 X10(3)/MCL (ref 2.1–9.2)
NEUTROPHILS NFR BLD AUTO: 58.3 %
NITRITE UR QL STRIP: NEGATIVE
NRBC BLD AUTO-RTO: 0 %
PH UR STRIP: 6.5 [PH]
PLATELET # BLD AUTO: 336 X10(3)/MCL (ref 130–400)
PMV BLD AUTO: 8.1 FL (ref 7.4–10.4)
POCT GLUCOSE: 70 MG/DL (ref 70–110)
POTASSIUM SERPL-SCNC: 3.3 MMOL/L (ref 3.5–5.1)
PROT SERPL-MCNC: 7 GM/DL (ref 6.4–8.3)
PROT UR QL STRIP: NEGATIVE
RBC # BLD AUTO: 4.82 X10(6)/MCL (ref 4.7–6.1)
RBC #/AREA URNS AUTO: NORMAL /HPF
SODIUM SERPL-SCNC: 141 MMOL/L (ref 136–145)
SP GR UR STRIP.AUTO: 1.02 (ref 1–1.03)
SQUAMOUS #/AREA URNS LPF: NORMAL /HPF
UROBILINOGEN UR STRIP-ACNC: NORMAL
WBC # BLD AUTO: 9.65 X10(3)/MCL (ref 4.5–11.5)
WBC #/AREA URNS AUTO: NORMAL /HPF

## 2025-01-01 PROCEDURE — 80053 COMPREHEN METABOLIC PANEL: CPT | Performed by: INTERNAL MEDICINE

## 2025-01-01 PROCEDURE — 99284 EMERGENCY DEPT VISIT MOD MDM: CPT

## 2025-01-01 PROCEDURE — 81001 URINALYSIS AUTO W/SCOPE: CPT | Performed by: INTERNAL MEDICINE

## 2025-01-01 PROCEDURE — 85025 COMPLETE CBC W/AUTO DIFF WBC: CPT | Performed by: INTERNAL MEDICINE

## 2025-01-01 PROCEDURE — 86140 C-REACTIVE PROTEIN: CPT | Performed by: INTERNAL MEDICINE

## 2025-01-01 PROCEDURE — 83735 ASSAY OF MAGNESIUM: CPT | Performed by: INTERNAL MEDICINE

## 2025-01-01 PROCEDURE — 82962 GLUCOSE BLOOD TEST: CPT

## 2025-01-01 PROCEDURE — 25000003 PHARM REV CODE 250: Performed by: INTERNAL MEDICINE

## 2025-01-01 RX ORDER — DICLOFENAC SODIUM 50 MG/1
50 TABLET, DELAYED RELEASE ORAL 2 TIMES DAILY
Qty: 20 TABLET | Refills: 0 | Status: SHIPPED | OUTPATIENT
Start: 2025-01-01

## 2025-01-01 RX ORDER — DOXYCYCLINE 100 MG/1
100 CAPSULE ORAL 2 TIMES DAILY
Qty: 20 CAPSULE | Refills: 0 | Status: SHIPPED | OUTPATIENT
Start: 2025-01-01 | End: 2025-01-11

## 2025-01-01 RX ADMIN — POTASSIUM BICARBONATE 50 MEQ: 977.5 TABLET, EFFERVESCENT ORAL at 10:01

## 2025-01-02 NOTE — ED NOTES
Patient asking many questions concerning diabetes, diet, dexcom use, insulin use, and voices he does not trust his primary care provider. Listened to patient. Encouraged. Educated. Offered referral if needed to [facility] medicine clinic.

## 2025-01-02 NOTE — ED PROVIDER NOTES
"Encounter Date: 1/1/2025       History     Chief Complaint   Patient presents with    Extremity Weakness     Pt c/o left arm weakness x2 days. Van (-). PMH diabetes. CBG 70 in triage     Presents with sensation of tingling in his left arm for the last few days, denies weakness, chest pain, nausea, neck pain or injury. Pt also concern about a "bump" in his left groin for the last few days also. Hx of DM    The history is provided by the patient.     Review of patient's allergies indicates:  No Known Allergies  No past medical history on file.  Past Surgical History:   Procedure Laterality Date    EGD, WITH CLOSED BIOPSY Left 9/10/2024    Procedure: EGD, WITH CLOSED BIOPSY;  Surgeon: Tana Liu MD;  Location: [facility] ENDOSCOPY;  Service: Gastroenterology;  Laterality: Left;     Family History   Problem Relation Name Age of Onset    Diabetes Mother      Hypertension Father      Diabetes Father      Aneurysm Sister      Diabetes Maternal Uncle      Cancer Paternal Aunt      Cancer Paternal Uncle      Aneurysm Paternal Grandmother      Cancer Paternal Grandfather          prostate    Stroke Paternal Grandfather      Cancer Paternal Cousin       Social History     Tobacco Use    Smoking status: Every Day     Current packs/day: 1.00     Average packs/day: 1 pack/day for 12.0 years (12.0 ttl pk-yrs)     Types: Cigarettes     Start date: 2013    Smokeless tobacco: Never   Substance Use Topics    Alcohol use: Yes     Comment: liquor 2 x a week    Drug use: Not Currently     Types: Marijuana     Comment: daily     Review of Systems   Neurological:  Positive for numbness.   All other systems reviewed and are negative.      Physical Exam     Initial Vitals [01/01/25 1923]   BP Pulse Resp Temp SpO2   (!) 145/100 77 18 98.4 °F (36.9 °C) 100 %      MAP       --         Physical Exam    Nursing note and vitals reviewed.  Constitutional: He appears well-developed and well-nourished. No distress.   HENT:   Head: Normocephalic " and atraumatic. Mouth/Throat: Oropharynx is clear and moist.   Eyes: Conjunctivae and EOM are normal. Pupils are equal, round, and reactive to light.   Neck: Neck supple.   No bruit   Normal range of motion.  Cardiovascular:  Regular rhythm, normal heart sounds and intact distal pulses.           Pulmonary/Chest: Breath sounds normal. No respiratory distress.   Abdominal: Abdomen is soft. Bowel sounds are normal. He exhibits no distension. There is no abdominal tenderness. There is no rebound and no guarding.   Genitourinary:    Genitourinary Comments: Left inguinal LAD palpable, non tender, No hernia defect palpable     Musculoskeletal:         General: No edema. Normal range of motion.      Cervical back: Normal range of motion and neck supple.     Neurological: He is alert and oriented to person, place, and time. He has normal strength. No cranial nerve deficit or sensory deficit (Symetric normal sensationa nd strenght of upper extremities). GCS score is 15. GCS eye subscore is 4. GCS verbal subscore is 5. GCS motor subscore is 6.   Skin: Skin is warm and dry. No rash noted.   Psychiatric: Thought content normal.         ED Course   Procedures  Labs Reviewed   COMPREHENSIVE METABOLIC PANEL - Abnormal       Result Value    Sodium 141      Potassium 3.3 (*)     Chloride 109 (*)     CO2 25      Glucose 71 (*)     Blood Urea Nitrogen 16.9      Creatinine 1.03      Calcium 9.2      Protein Total 7.0      Albumin 3.5      Globulin 3.5      Albumin/Globulin Ratio 1.0 (*)     Bilirubin Total 0.2      ALP 64      ALT 26      AST 34      eGFR >60      Anion Gap 7.0      BUN/Creatinine Ratio 16     C-REACTIVE PROTEIN - Abnormal    CRP 33.50 (*)    URINALYSIS, REFLEX TO URINE CULTURE - Normal    Color, UA Light-Yellow      Appearance, UA Clear      Specific Gravity, UA 1.021      pH, UA 6.5      Protein, UA Negative      Glucose, UA Normal      Ketones, UA Negative      Blood, UA Negative      Bilirubin, UA Negative       Urobilinogen, UA Normal      Nitrites, UA Negative      Leukocyte Esterase, UA Negative      RBC, UA 0-5      WBC, UA 0-5      Bacteria, UA None Seen      Squamous Epithelial Cells, UA None Seen      Hyaline Casts, UA None Seen     MAGNESIUM - Normal    Magnesium Level 2.00     CBC W/ AUTO DIFFERENTIAL    Narrative:     The following orders were created for panel order CBC auto differential.  Procedure                               Abnormality         Status                     ---------                               -----------         ------                     CBC with Differential[[id number]]                           Final result                 Please view results for these tests on the individual orders.   CBC WITH DIFFERENTIAL    WBC 9.65      RBC 4.82      Hgb 14.8      Hct 44.1      MCV 91.5      MCH 30.7      MCHC 33.6      RDW 12.0      Platelet 336      MPV 8.1      Neut % 58.3      Lymph % 27.8      Mono % 11.1      Eos % 2.1      Basophil % 0.4      Lymph # 2.68      Neut # 5.63      Mono # 1.07      Eos # 0.20      Baso # 0.04      IG# 0.03      IG% 0.3      NRBC% 0.0     EXTRA TUBES    Narrative:     The following orders were created for panel order EXTRA TUBES.  Procedure                               Abnormality         Status                     ---------                               -----------         ------                     Light Blue Top Hold[[id number]]                             In process                 Gold Top Hold[[id number]]                                   In process                   Please view results for these tests on the individual orders.   LIGHT BLUE TOP HOLD   GOLD TOP HOLD   POCT GLUCOSE    POCT Glucose 70            Imaging Results    None          Medications   potassium bicarbonate disintegrating tablet 50 mEq (has no administration in time range)     Medical Decision Making  Amount and/or Complexity of Data Reviewed  Labs: ordered. Decision-making details  documented in ED Course.      Additional MDM:   Differential Diagnosis:   Peripheral neuropathy, electrolytes imbalances, cervical radiculopathy, malignancy, among others                                      Clinical Impression:  Final diagnoses:  [E87.6] Hypokalemia (Primary)  [R20.2] Paresthesia of left upper extremity  [R59.0] LAD (lymphadenopathy), inguinal          ED Disposition Condition    Discharge Stable          ED Prescriptions       Medication Sig Dispense Start Date End Date Auth. Provider    doxycycline (VIBRAMYCIN) 100 MG Cap Take 1 capsule (100 mg total) by mouth 2 (two) times daily. for 10 days 20 capsule 1/1/2025 1/11/2025 Camron Perales MD    diclofenac (VOLTAREN) 50 MG EC tablet Take 1 tablet (50 mg total) by mouth 2 (two) times daily. 20 tablet 1/1/2025 -- Camron Perales MD          Follow-up Information       Follow up With Specialties Details Why Contact Info    Arlyn Zendejas FNP Family Medicine Schedule an appointment as soon as possible for a visit in 1 week  [address] [facility] [place] [address]  [phone]      [facility] Dept Emergency Medicine  If symptoms worsen [address] [facility] [address] [phone]             Camron Perales MD  01/01/25 1965

## (undated) DEVICE — MOUTHPIECE ENDO 60FR

## (undated) DEVICE — MANIFOLD 4 PORT

## (undated) DEVICE — FORCEP ALLIGATOR 2.8MM W/NDL

## (undated) DEVICE — KIT SURGICAL COLON .25 1.1OZ